# Patient Record
Sex: FEMALE | Race: WHITE | NOT HISPANIC OR LATINO | Employment: OTHER | URBAN - METROPOLITAN AREA
[De-identification: names, ages, dates, MRNs, and addresses within clinical notes are randomized per-mention and may not be internally consistent; named-entity substitution may affect disease eponyms.]

---

## 2022-10-27 DIAGNOSIS — R93.89 THICKENED ENDOMETRIUM: Primary | ICD-10-CM

## 2022-10-27 DIAGNOSIS — N84.0 UTERINE POLYP: ICD-10-CM

## 2022-11-21 PROBLEM — N80.9 ENDOMETRIOSIS: Status: ACTIVE | Noted: 2022-11-21

## 2023-03-01 PROBLEM — Z34.90 PRENATAL CARE, ANTEPARTUM: Status: ACTIVE | Noted: 2023-03-01

## 2023-03-01 PROBLEM — O09.819 HIGH RISK PREGNANCY DUE TO ASSISTED REPRODUCTIVE TECHNOLOGY: Status: ACTIVE | Noted: 2023-03-01

## 2023-04-25 ENCOUNTER — ROUTINE PRENATAL (OUTPATIENT)
Dept: OBSTETRICS AND GYNECOLOGY | Facility: CLINIC | Age: 28
End: 2023-04-25
Payer: MEDICAID

## 2023-04-25 VITALS — BODY MASS INDEX: 23.01 KG/M2 | WEIGHT: 121.8 LBS | SYSTOLIC BLOOD PRESSURE: 100 MMHG | DIASTOLIC BLOOD PRESSURE: 60 MMHG

## 2023-04-25 DIAGNOSIS — Z34.90 PRENATAL CARE, ANTEPARTUM: Primary | ICD-10-CM

## 2023-04-25 DIAGNOSIS — O09.819 HIGH RISK PREGNANCY DUE TO ASSISTED REPRODUCTIVE TECHNOLOGY: ICD-10-CM

## 2023-04-25 LAB
GLUCOSE UR STRIP-MCNC: NEGATIVE MG/DL
PROT UR STRIP-MCNC: NEGATIVE MG/DL

## 2023-04-25 RX ORDER — ASPIRIN 81 MG/1
81 TABLET, CHEWABLE ORAL DAILY
COMMUNITY

## 2023-04-25 NOTE — PROGRESS NOTES
"      OB FOLLOW UP  CC- Here for care of pregnancy        Emma Fernandez is a 28 y.o.  17w5d patient being seen today for her obstetrical follow up visit. Patient reports occasional headaches.     Her prenatal care is complicated by (and status) :   Patient Active Problem List   Diagnosis   • Endometriosis   • Prenatal care, antepartum   • High risk pregnancy due to assisted reproductive technology         Ultrasound Today: No    AFP: declines today, (also declined cfdna unless something is abnormal or concerning on anatomy u/s)    ROS -   Patient Denies: Loss of Fluid, Vaginal Spotting, Vision Changes, Nausea  and Vomiting   Fetal Movement : \"flutters\"   All other systems reviewed and are negative.       The additional following portions of the patient's history were reviewed and updated as appropriate: allergies, current medications, past family history, past medical history, past social history, past surgical history and problem list.    I have reviewed and agree with the HPI, ROS, and historical information as entered above. Florence Colorado MD        EXAM:     Prenatal Vitals  BP: 100/60  Weight: 55.2 kg (121 lb 12.8 oz)   Fetal Heart Rate: 155   Pelvic Exam:        Urine Glucose Read-only: Negative  Urine Protein Read-only: Negative           Assessment and Plan    Problem List Items Addressed This Visit        Gravid and     Prenatal care, antepartum - Primary    Relevant Orders    US Ob 14 + Weeks Single or First Gestation    POC Urinalysis Dipstick (Completed)    High risk pregnancy due to assisted reproductive technology    Overview     IVF by Dr. Hill in in [place]  Recommend baby aspirin weeks 12 through 36  We will have [facility] perform fetal echo secondary to IVF pregnancy.         Relevant Orders    US Ob 14 + Weeks Single or First Gestation    POC Urinalysis Dipstick (Completed)       1. Pregnancy at 17w5d  2. Fetal status reassuring.   3. Counseled on MSAFP alone in relation to OTD " and placental issues.    4. Anatomy scan next visit.   5. Activity and Exercise discussed.  6. Patient is on Prenatal vitamins  Return in about 2 weeks (around 5/9/2023) for anatomy usg.    Florence Colorado MD  04/25/2023

## 2023-05-01 ENCOUNTER — REFERRAL TRIAGE (OUTPATIENT)
Dept: LABOR AND DELIVERY | Facility: HOSPITAL | Age: 28
End: 2023-05-01
Payer: MEDICAID

## 2023-05-08 ENCOUNTER — ROUTINE PRENATAL (OUTPATIENT)
Dept: OBSTETRICS AND GYNECOLOGY | Facility: CLINIC | Age: 28
End: 2023-05-08
Payer: MEDICAID

## 2023-05-08 VITALS — SYSTOLIC BLOOD PRESSURE: 106 MMHG | DIASTOLIC BLOOD PRESSURE: 70 MMHG | WEIGHT: 122.6 LBS | BODY MASS INDEX: 23.17 KG/M2

## 2023-05-08 DIAGNOSIS — O44.40 LOW-LYING PLACENTA: ICD-10-CM

## 2023-05-08 DIAGNOSIS — O09.819 HIGH RISK PREGNANCY DUE TO ASSISTED REPRODUCTIVE TECHNOLOGY: ICD-10-CM

## 2023-05-08 DIAGNOSIS — Z34.92 PRENATAL CARE IN SECOND TRIMESTER: Primary | ICD-10-CM

## 2023-05-08 LAB
GLUCOSE UR STRIP-MCNC: NEGATIVE MG/DL
PROT UR STRIP-MCNC: NEGATIVE MG/DL

## 2023-05-08 NOTE — PROGRESS NOTES
OB FOLLOW UP  CC- Here for care of pregnancy        Emma Fernandez is a 28 y.o.  19w4d patient being seen today for her obstetrical follow up visit. Patient reports headaches without vision changes. She states that headaches usually go away with rest.    Her prenatal care is complicated by (and status) : None  Patient Active Problem List   Diagnosis   • Endometriosis   • Prenatal care, antepartum   • High risk pregnancy due to assisted reproductive technology   • Low-lying placenta       Flu Status: Declines  US done today: Yes.  Findings showed Female infant in footling breech presentation with fetal heart rate of 146.  Placenta is posterior and low-lying.  Three-vessel cord noted normal fluid volume.  Estimated fetal weight 11 ounces.  No fetal anomaly seen.  Cervical length 34 mm..  I have personally evaluated the U/S and agree with the findings. Florence Colorado MD      ROS -   Patient Reports : Headaches  Patient Denies: Loss of Fluid, Vaginal Spotting, Vision Changes, Nausea  and Vomiting   Fetal Movement : normal  All other systems reviewed and are negative.       The additional following portions of the patient's history were reviewed and updated as appropriate: allergies, current medications, past family history, past medical history, past social history, past surgical history and problem list.    I have reviewed and agree with the HPI, ROS, and historical information as entered above. Florence Colorado MD    /70   Wt 55.6 kg (122 lb 9.6 oz)   LMP 10/24/2022 (Exact Date)   BMI 23.17 kg/m²       EXAM:     Prenatal Vitals  BP: 106/70  Weight: 55.6 kg (122 lb 9.6 oz)              Urine Glucose Read-only: Negative  Urine Protein Read-only: Negative       Assessment and Plan    Problem List Items Addressed This Visit        Gravid and     High risk pregnancy due to assisted reproductive technology    Overview     IVF by Dr. Hill in in [place]  Recommend baby aspirin weeks  12 through 36  We will have [facility] perform fetal echo secondary to IVF pregnancy.         Relevant Orders    [facility]  Diagnostic [facility]    Low-lying placenta    Overview     Seen at 19+4 weeks.  Advised follow-up.         Relevant Orders    [facility] Diagnostic [facility]   Other Visit Diagnoses     Prenatal care in second trimester    -  Primary    Relevant Orders    POC Urinalysis Dipstick (Completed)          1. Pregnancy at 19w4d  2. Anatomy scan today is Complete.  Only low-lying placenta seen.  Follow-up with [facility] for fetal echo in 4 weeks.  3. Fetal status reassuring.   4. Activity and Exercise discussed.  5. Patient is on Prenatal vitamins  Return in about 4 weeks (around 2023) for [facility] same day.    Florence Colorado MD  2023

## 2023-05-16 ENCOUNTER — PATIENT OUTREACH (OUTPATIENT)
Dept: LABOR AND DELIVERY | Facility: HOSPITAL | Age: 28
End: 2023-05-16
Payer: MEDICAID

## 2023-05-16 NOTE — OUTREACH NOTE
[facility]  Unable to Reach       Questions/Answers    Flowsheet Row Responses   Pending Outreach Confirm Patient Interest   Call Attempt First   Outcome No answer/busy, Left message, [employer] message sent to patient   Next Call Attempt Date 05/22/23          Confirmation of interest letter sent via [employer] message.    BETSEY Santos RN  Maternity Nurse Navigator    5/16/2023, 17:37 EDT

## 2023-05-22 ENCOUNTER — PATIENT OUTREACH (OUTPATIENT)
Dept: LABOR AND DELIVERY | Facility: HOSPITAL | Age: 28
End: 2023-05-22
Payer: MEDICAID

## 2023-05-22 NOTE — OUTREACH NOTE
[facility]  Unable to Reach       Questions/Answers    Flowsheet Row Responses   Pending Outreach Confirm Patient Interest   Call Attempt Second   Outcome No answer/busy, Left message   Unable to reach comments: Contact information provided.        Contact info provided, encouraged to call if she has any questions, concerns, or needs assistance with resources.     BETSEY Santos RN  Maternity Nurse Navigator    5/22/2023, 18:25 EDT

## 2023-06-05 ENCOUNTER — ROUTINE PRENATAL (OUTPATIENT)
Dept: OBSTETRICS AND GYNECOLOGY | Facility: CLINIC | Age: 28
End: 2023-06-05
Payer: MEDICAID

## 2023-06-05 ENCOUNTER — OFFICE VISIT (OUTPATIENT)
Dept: OBSTETRICS AND GYNECOLOGY | Facility: HOSPITAL | Age: 28
End: 2023-06-05
Payer: MEDICAID

## 2023-06-05 ENCOUNTER — HOSPITAL ENCOUNTER (OUTPATIENT)
Dept: WOMENS IMAGING | Facility: HOSPITAL | Age: 28
Discharge: HOME | End: 2023-06-05
Admitting: OBSTETRICS & GYNECOLOGY
Payer: MEDICAID

## 2023-06-05 VITALS
BODY MASS INDEX: 23.48 KG/M2 | HEIGHT: 62 IN | DIASTOLIC BLOOD PRESSURE: 68 MMHG | SYSTOLIC BLOOD PRESSURE: 96 MMHG | WEIGHT: 127.6 LBS

## 2023-06-05 VITALS — WEIGHT: 127.1 LBS | BODY MASS INDEX: 23.25 KG/M2 | DIASTOLIC BLOOD PRESSURE: 68 MMHG | SYSTOLIC BLOOD PRESSURE: 108 MMHG

## 2023-06-05 DIAGNOSIS — O09.819 HIGH RISK PREGNANCY DUE TO ASSISTED REPRODUCTIVE TECHNOLOGY: ICD-10-CM

## 2023-06-05 DIAGNOSIS — O44.40 LOW-LYING PLACENTA: Primary | ICD-10-CM

## 2023-06-05 DIAGNOSIS — O44.40 LOW-LYING PLACENTA: ICD-10-CM

## 2023-06-05 DIAGNOSIS — Z34.92 PRENATAL CARE IN SECOND TRIMESTER: Primary | ICD-10-CM

## 2023-06-05 PROBLEM — O44.20 MARGINAL PLACENTA PREVIA: Status: ACTIVE | Noted: 2023-05-08

## 2023-06-05 LAB
GLUCOSE UR STRIP-MCNC: NEGATIVE MG/DL
PROT UR STRIP-MCNC: NEGATIVE MG/DL

## 2023-06-05 PROCEDURE — 76825 ECHO EXAM OF FETAL HEART: CPT | Performed by: OBSTETRICS & GYNECOLOGY

## 2023-06-05 PROCEDURE — 76825 ECHO EXAM OF FETAL HEART: CPT

## 2023-06-05 PROCEDURE — 93325 DOPPLER ECHO COLOR FLOW MAPG: CPT | Performed by: OBSTETRICS & GYNECOLOGY

## 2023-06-05 PROCEDURE — 76811 OB US DETAILED SNGL FETUS: CPT

## 2023-06-05 PROCEDURE — 93325 DOPPLER ECHO COLOR FLOW MAPG: CPT

## 2023-06-05 PROCEDURE — 76811 OB US DETAILED SNGL FETUS: CPT | Performed by: OBSTETRICS & GYNECOLOGY

## 2023-06-05 NOTE — PROGRESS NOTES
OB FOLLOW UP  CC- Here for care of pregnancy        Emma Fernandez is a 28 y.o.  23w4d patient being seen today for her obstetrical follow up visit. Patient reports an increase in anxiety since her grandmother passed away on 23.    Her prenatal care is complicated by (and status) :   Patient Active Problem List   Diagnosis    Endometriosis    Prenatal care, antepartum    High risk pregnancy due to assisted reproductive technology    Low-lying placenta     US done today: Yes at [facility].  Report pending    ROS -   Patient Reports : No Problems  Patient Denies: Loss of Fluid, Vaginal Spotting, Vision Changes, Headaches, Nausea , Vomiting , Contractions, and Epigastric pain  Fetal Movement : normal  All other systems reviewed and are negative.       The additional following portions of the patient's history were reviewed and updated as appropriate: allergies, current medications, past family history, past medical history, past social history, past surgical history, and problem list.      I have reviewed and agree with the HPI, ROS, and historical information as entered above. Florence Colorado MD      /68   Wt 57.7 kg (127 lb 1.6 oz)   LMP 10/24/2022 (Exact Date)   BMI 23.25 kg/m²       EXAM:     Prenatal Vitals  BP: 108/68  Weight: 57.7 kg (127 lb 1.6 oz)   Fetal Heart Rate: usg               Urine Glucose Read-only: Negative  Urine Protein Read-only: Negative       Assessment and Plan    Problem List Items Addressed This Visit          Gravid and     High risk pregnancy due to assisted reproductive technology    Overview     IVF by Dr. Hill in in [place]  Recommend baby aspirin weeks 12 through 36  We will have [facility] perform fetal echo secondary to IVF pregnancy.         Low-lying placenta    Overview     Seen at 19+4 weeks.  Still present at 23 weeks at [facility].  [facility] will reevaluate at 32 weeks.          Other Visit Diagnoses       Prenatal care in second trimester    -  Primary     Relevant Orders    POC Urinalysis Dipstick (Completed)            Pregnancy at 23w4d  Fetal status reassuring.  [facility] ultrasound today.  Report still pending.  1 hour gtt, CBC, Antibody screen, and TDAP next visit. Instructions given  Discussed/encouraged TDAP vaccination after 28 weeks  Activity and Exercise discussed.  Return in about 4 weeks (around 7/3/2023) for glucola.    Florence Colorado MD  06/05/2023

## 2023-07-11 PROBLEM — O99.019 MATERNAL ANEMIA IN PREGNANCY, ANTEPARTUM: Status: ACTIVE | Noted: 2023-07-11

## 2023-07-11 PROBLEM — R73.09 ELEVATED GLUCOSE TOLERANCE TEST: Status: ACTIVE | Noted: 2023-07-11

## 2023-07-14 PROBLEM — O24.410 DIET CONTROLLED GESTATIONAL DIABETES MELLITUS (GDM) IN THIRD TRIMESTER: Status: ACTIVE | Noted: 2023-07-11

## 2023-07-18 PROBLEM — F41.9 ANXIETY: Status: ACTIVE | Noted: 2021-02-01

## 2023-08-07 ENCOUNTER — ROUTINE PRENATAL (OUTPATIENT)
Dept: OBSTETRICS AND GYNECOLOGY | Facility: CLINIC | Age: 28
End: 2023-08-07
Payer: MEDICAID

## 2023-08-07 ENCOUNTER — HOSPITAL ENCOUNTER (OUTPATIENT)
Dept: WOMENS IMAGING | Facility: HOSPITAL | Age: 28
Discharge: HOME | End: 2023-08-07
Admitting: OBSTETRICS & GYNECOLOGY
Payer: MEDICAID

## 2023-08-07 ENCOUNTER — OFFICE VISIT (OUTPATIENT)
Dept: OBSTETRICS AND GYNECOLOGY | Facility: HOSPITAL | Age: 28
End: 2023-08-07
Payer: MEDICAID

## 2023-08-07 VITALS — WEIGHT: 139 LBS | BODY MASS INDEX: 26.26 KG/M2 | SYSTOLIC BLOOD PRESSURE: 102 MMHG | DIASTOLIC BLOOD PRESSURE: 70 MMHG

## 2023-08-07 VITALS — BODY MASS INDEX: 26.45 KG/M2 | SYSTOLIC BLOOD PRESSURE: 105 MMHG | WEIGHT: 140 LBS | DIASTOLIC BLOOD PRESSURE: 71 MMHG

## 2023-08-07 DIAGNOSIS — O36.63X0 MACROSOMIA OF FETUS AFFECTING MANAGEMENT OF MOTHER IN THIRD TRIMESTER, SINGLE OR UNSPECIFIED FETUS: ICD-10-CM

## 2023-08-07 DIAGNOSIS — O44.40 LOW-LYING PLACENTA: ICD-10-CM

## 2023-08-07 DIAGNOSIS — O44.20 MARGINAL PLACENTA PREVIA: ICD-10-CM

## 2023-08-07 DIAGNOSIS — O09.819 HIGH RISK PREGNANCY DUE TO ASSISTED REPRODUCTIVE TECHNOLOGY: ICD-10-CM

## 2023-08-07 DIAGNOSIS — Z3A.32 32 WEEKS GESTATION OF PREGNANCY: ICD-10-CM

## 2023-08-07 DIAGNOSIS — O24.410 DIET CONTROLLED GESTATIONAL DIABETES MELLITUS (GDM) IN THIRD TRIMESTER: ICD-10-CM

## 2023-08-07 DIAGNOSIS — Z34.90 PRENATAL CARE, ANTEPARTUM: Primary | ICD-10-CM

## 2023-08-07 DIAGNOSIS — O44.20 MARGINAL PLACENTA PREVIA: Primary | ICD-10-CM

## 2023-08-07 DIAGNOSIS — O99.019 MATERNAL ANEMIA IN PREGNANCY, ANTEPARTUM: ICD-10-CM

## 2023-08-07 LAB
GLUCOSE UR STRIP-MCNC: NEGATIVE MG/DL
PROT UR STRIP-MCNC: NEGATIVE MG/DL

## 2023-08-07 PROCEDURE — 76816 OB US FOLLOW-UP PER FETUS: CPT

## 2023-08-07 PROCEDURE — 76819 FETAL BIOPHYS PROFIL W/O NST: CPT

## 2023-08-07 PROCEDURE — 99214 OFFICE O/P EST MOD 30 MIN: CPT | Performed by: OBSTETRICS & GYNECOLOGY

## 2023-08-07 RX ORDER — CETIRIZINE HYDROCHLORIDE 10 MG/1
TABLET ORAL
COMMUNITY
Start: 2023-08-02

## 2023-08-07 NOTE — PROGRESS NOTES
Patient denies bleeding, leaking fluid or contractions  NIPT declined  Patient is followed by Dr. Garza for blood sugar and patient states she is diet controlled only.  Patients A1C was 5.0  Patients next follow up with Dr. Colorado is today

## 2023-08-07 NOTE — PROGRESS NOTES
OB FOLLOW UP  CC- Here for care of pregnancy        Emma Fernandez is a 28 y.o.  32w4d patient being seen today for her obstetrical follow up visit. Patient reports irregular contractions and increase swelling.     Her prenatal care is complicated by (and status) :  GDM diet controlled. Her average fasting BG is 70-80. Her average 2hr PP BG is <100, highest has been 118  Patient Active Problem List   Diagnosis    Endometriosis    Prenatal care, antepartum    High risk pregnancy due to assisted reproductive technology    Marginal placenta previa    Diet controlled gestational diabetes mellitus (GDM) in third trimester    Maternal anemia in pregnancy, antepartum    Anxiety         TDAP status: received at last visit  Rhogam status: was not indicated  28 week labs: Reviewed, Labs show anemia. She is taking additional iron supplement., and Failed 3hr gtt. Patient has seen endocrine.  Ultrasound Today: Yes at [facility]. Report is pending. Per patient baby is LGA at 5#5oz, placenta has not moved and she will see them back in 4 weeks.  Non Stress Test: No.      ROS -   Patient Reports : Contractions  Patient Denies: Loss of Fluid, Vaginal Spotting, Vision Changes, and Headaches  Fetal Movement : normal  All other systems reviewed and are negative.       The additional following portions of the patient's history were reviewed and updated as appropriate: allergies, current medications, past family history, past medical history, past social history, past surgical history, and problem list.    I have reviewed and agree with the HPI, ROS, and historical information as entered above. Florence Colorado MD      /70   Wt 63 kg (139 lb)   LMP 10/24/2022 (Exact Date)   BMI 26.26 kg/mý         EXAM:     Prenatal Vitals  BP: 102/70  Weight: 63 kg (139 lb)   Fetal Heart Rate: usg               Urine Glucose Read-only: Negative  Urine Protein Read-only: Negative           Assessment and Plan    Problem List Items  Addressed This Visit          Endocrine and Metabolic    Diet controlled gestational diabetes mellitus (GDM) in third trimester    Overview     147 and 28 weeks.  Failed 3hour. Will set up with endocrine            Gravid and     Prenatal care, antepartum - Primary    Relevant Orders    POC Urinalysis Dipstick (Completed)    High risk pregnancy due to assisted reproductive technology    Overview     IVF by Dr. Hill in in [place]  Recommend baby aspirin weeks 12 through 36  We will have [facility] perform fetal echo secondary to IVF pregnancy.         Marginal placenta previa    Overview     Posterior.  Seen at 19+4 weeks.  Still present at 23 weeks at [facility].      [facility] at 32wks previa still present. Repeat scan at [facility] at 36w            Hematology and Neoplasia    Maternal anemia in pregnancy, antepartum    Overview     Hemoglobin 8.4 at 28 weeks.  Advised twice daily iron supplementation.         Relevant Medications    Ferrous Sulfate ER (Slow Fe) 142 (45 Fe) MG tablet controlled-release       Pregnancy at 32w4d  Fetal status reassuring.  28 week labs reviewed.    Activity and Exercise discussed.  Fetal movement/PTL or Labor precautions  Patient is on Prenatal vitamins  Reviewed FSBS goals: fasting <90, 2hr PP < 120  Return in about 2 weeks (around 2023).    Florence Colorado MD  2023

## 2023-08-07 NOTE — PROGRESS NOTES
Patient seen in Maternal Fetal Medicine clinic today. Please see full note in under imaging tab of patient chart in Epic (Viewpoint report).    Liliam Ann MD

## 2023-08-18 ENCOUNTER — OFFICE VISIT (OUTPATIENT)
Dept: ENDOCRINOLOGY | Facility: CLINIC | Age: 28
End: 2023-08-18
Payer: MEDICAID

## 2023-08-18 VITALS
HEART RATE: 115 BPM | OXYGEN SATURATION: 99 % | WEIGHT: 139.6 LBS | BODY MASS INDEX: 26.36 KG/M2 | HEIGHT: 61 IN | DIASTOLIC BLOOD PRESSURE: 60 MMHG | SYSTOLIC BLOOD PRESSURE: 96 MMHG

## 2023-08-18 DIAGNOSIS — O24.410 DIET CONTROLLED GESTATIONAL DIABETES MELLITUS (GDM) IN THIRD TRIMESTER: Primary | ICD-10-CM

## 2023-08-18 NOTE — LETTER
August 18, 2023       No Recipients    Patient: Emma Fernandez   YOB: 1995   Date of Visit: 8/18/2023     Dear Florence Colorado MD:       Thank you for referring Emma Fernandez to me for evaluation. Below are the relevant portions of my assessment and plan of care.    If you have questions, please do not hesitate to call me. I look forward to following Emma along with you.         Sincerely,        Inessa Garza MD        CC:   No Recipients    Inessa Garza MD  08/18/23 1237  Sign when Signing Visit  Emma Fernandez 28 y.o.  CC:Follow-up and Gestational Diabetes (ROSIE 9-28-23 OB Florence Colorado MD)      [place]: Follow-up and Gestational Diabetes (ROSIE 9-28-23 OB Florence Colorado MD)    Doing well overall  Diet controlled GDM   Baby LGA- OB considering early delivery   Baby 5.8 lbs last week  Is 34 weeks  Reviewed u/s and baby is larger overall (not just AC)  She is emailing weekly   Discussed if all sugars remain normal she can reduce testing to fasting and 2 hour pp largest meal after 36 weeks    No Known Allergies    Current Outpatient Medications:     Acetone, Urine, Test (Ketone Test) strip, 1 each by In Vitro route Daily., Disp: 50 strip, Rfl: 2    aspirin 81 MG chewable tablet, Chew 1 tablet Daily., Disp: , Rfl:     Blood Glucose Monitoring Suppl w/Device kit, 1 each Daily. Please dispense any meter, strips and lancets that are formulary, Disp: 1 each, Rfl: 0    cetirizine (zyrTEC) 10 MG tablet, , Disp: , Rfl:     docusate sodium (Colace Clear) 50 MG capsule, , Disp: , Rfl:     Ferrous Sulfate ER (Slow Fe) 142 (45 Fe) MG tablet controlled-release, , Disp: , Rfl:     Glucose Blood (Blood Glucose Test) strip, Test blood sugars QID, Disp: 150 each, Rfl: 3    Lancets misc, Test blood sugars QID, Disp: 100 each, Rfl: 5    Prenatal Vit-Fe Fumarate-FA (PRENATAL PO), Take  by mouth., Disp: , Rfl:   Patient Active Problem List    Diagnosis     Diet controlled gestational diabetes mellitus  "(GDM) in third trimester [O24.410]     Maternal anemia in pregnancy, antepartum [O99.019]     Marginal placenta previa [O44.20]     Prenatal care, antepartum [Z34.90]     High risk pregnancy due to assisted reproductive technology [O09.819]     Endometriosis [N80.9]     Anxiety [F41.9]      Review of Systems   Constitutional:  Negative for activity change, appetite change and unexpected weight change.   HENT:  Negative for congestion and rhinorrhea.    Eyes:  Negative for visual disturbance.   Respiratory:  Negative for cough and shortness of breath.    Cardiovascular:  Negative for palpitations and leg swelling.   Gastrointestinal:  Negative for constipation, diarrhea and nausea.   Genitourinary:  Negative for hematuria.   Musculoskeletal:  Negative for arthralgias, back pain, gait problem, joint swelling and myalgias.   Skin:  Negative for color change, rash and wound.   Allergic/Immunologic: Negative for environmental allergies, food allergies and immunocompromised state.   Neurological:  Negative for dizziness, weakness and light-headedness.   Psychiatric/Behavioral:  Negative for confusion, decreased concentration, dysphoric mood and sleep disturbance. The patient is not nervous/anxious.    Social History     Socioeconomic History    Marital status:    Tobacco Use    Smoking status: Never    Smokeless tobacco: Never   Vaping Use    Vaping Use: Never used   Substance and Sexual Activity    Alcohol use: Never    Drug use: Never    Sexual activity: Yes     Partners: Male     Family History   Problem Relation Age of Onset    Colon cancer Father         Rectal Cancer    Diabetes Maternal Grandmother     Prostate cancer Paternal Grandfather     Breast cancer Neg Hx     Ovarian cancer Neg Hx     Uterine cancer Neg Hx      BP 96/60   Pulse 115   Ht 154.9 cm (61\")   Wt 63.3 kg (139 lb 9.6 oz)   LMP 10/24/2022 (Exact Date)   SpO2 99%   BMI 26.38 kg/mý   Physical Exam  Vitals and nursing " note reviewed.   Constitutional:       Appearance: Normal appearance. She is well-developed.   HENT:      Head: Normocephalic and atraumatic.   Eyes:      General: Lids are normal.      Extraocular Movements: Extraocular movements intact.      Conjunctiva/sclera: Conjunctivae normal.      Pupils: Pupils are equal, round, and reactive to light.   Neck:      Thyroid: No thyroid mass or thyromegaly.      Vascular: No carotid bruit.      Trachea: Trachea normal. No tracheal deviation.   Cardiovascular:      Rate and Rhythm: Normal rate and regular rhythm.      Pulses: Normal pulses.      Heart sounds: Normal heart sounds. No murmur heard.    No friction rub. No gallop.   Pulmonary:      Effort: Pulmonary effort is normal. No respiratory distress.      Breath sounds: Normal breath sounds. No wheezing.   Musculoskeletal:         General: No deformity. Normal range of motion.      Cervical back: Normal range of motion and neck supple.   Lymphadenopathy:      Cervical: No cervical adenopathy.   Skin:     General: Skin is warm and dry.      Findings: No erythema or rash.      Nails: There is no clubbing.   Neurological:      General: No focal deficit present.      Mental Status: She is alert and oriented to person, place, and time.      Cranial Nerves: No cranial nerve deficit.      Deep Tendon Reflexes: Reflexes are normal and symmetric. Reflexes normal.   Psychiatric:         Mood and Affect: Mood normal.         Speech: Speech normal.         Behavior: Behavior normal.         Thought Content: Thought content normal.         Judgment: Judgment normal.     Results for orders placed or performed in visit on 08/07/23   POC Urinalysis Dipstick    Specimen: Urine   Result Value Ref Range    Glucose, UA Negative Negative mg/dL    Protein, POC Negative Negative mg/dL     Diagnoses and all orders for this visit:    1. Diet controlled gestational diabetes mellitus (GDM) in third trimester (Primary)  Assessment & Plan:  With normal  blood sugars maintained with diet  Continue weekly email   Can reduce testing after 36 weeks  Anticipated early delivery due to LGA baby   F/u here 8 weeks pp for repeat testing       Return in about 12 weeks (around 11/10/2023) for Recheck.    Inessa Garza MD  Signed Inessa Garza MD

## 2023-08-18 NOTE — ASSESSMENT & PLAN NOTE
With normal blood sugars maintained with diet  Continue weekly email   Can reduce testing after 36 weeks  Anticipated early delivery due to LGA baby   F/u here 8 weeks pp for repeat testing

## 2023-08-18 NOTE — PROGRESS NOTES
Emma Fernandez 28 y.o.  CC:Follow-up and Gestational Diabetes (ROSIE 9-28-23 OB Florence Colorado MD)      [place]: Follow-up and Gestational Diabetes (ROSIE 9-28-23 OB Florence Colorado MD)    Doing well overall  Diet controlled GDM   Baby LGA- OB considering early delivery   Baby 5.8 lbs last week  Is 34 weeks  Reviewed u/s and baby is larger overall (not just AC)  She is emailing weekly   Discussed if all sugars remain normal she can reduce testing to fasting and 2 hour pp largest meal after 36 weeks    No Known Allergies    Current Outpatient Medications:     Acetone, Urine, Test (Ketone Test) strip, 1 each by In Vitro route Daily., Disp: 50 strip, Rfl: 2    aspirin 81 MG chewable tablet, Chew 1 tablet Daily., Disp: , Rfl:     Blood Glucose Monitoring Suppl w/Device kit, 1 each Daily. Please dispense any meter, strips and lancets that are formulary, Disp: 1 each, Rfl: 0    cetirizine (zyrTEC) 10 MG tablet, , Disp: , Rfl:     docusate sodium (Colace Clear) 50 MG capsule, , Disp: , Rfl:     Ferrous Sulfate ER (Slow Fe) 142 (45 Fe) MG tablet controlled-release, , Disp: , Rfl:     Glucose Blood (Blood Glucose Test) strip, Test blood sugars QID, Disp: 150 each, Rfl: 3    Lancets misc, Test blood sugars QID, Disp: 100 each, Rfl: 5    Prenatal Vit-Fe Fumarate-FA (PRENATAL PO), Take  by mouth., Disp: , Rfl:   Patient Active Problem List    Diagnosis     Diet controlled gestational diabetes mellitus (GDM) in third trimester [O24.410]     Maternal anemia in pregnancy, antepartum [O99.019]     Marginal placenta previa [O44.20]     Prenatal care, antepartum [Z34.90]     High risk pregnancy due to assisted reproductive technology [O09.819]     Endometriosis [N80.9]     Anxiety [F41.9]      Review of Systems   Constitutional:  Negative for activity change, appetite change and unexpected weight change.   HENT:  Negative for congestion and rhinorrhea.    Eyes:  Negative for visual disturbance.   Respiratory:  Negative for cough and shortness of  "breath.    Cardiovascular:  Negative for palpitations and leg swelling.   Gastrointestinal:  Negative for constipation, diarrhea and nausea.   Genitourinary:  Negative for hematuria.   Musculoskeletal:  Negative for arthralgias, back pain, gait problem, joint swelling and myalgias.   Skin:  Negative for color change, rash and wound.   Allergic/Immunologic: Negative for environmental allergies, food allergies and immunocompromised state.   Neurological:  Negative for dizziness, weakness and light-headedness.   Psychiatric/Behavioral:  Negative for confusion, decreased concentration, dysphoric mood and sleep disturbance. The patient is not nervous/anxious.    Social History     Socioeconomic History    Marital status:    Tobacco Use    Smoking status: Never    Smokeless tobacco: Never   Vaping Use    Vaping Use: Never used   Substance and Sexual Activity    Alcohol use: Never    Drug use: Never    Sexual activity: Yes     Partners: Male     Family History   Problem Relation Age of Onset    Colon cancer Father         Rectal Cancer    Diabetes Maternal Grandmother     Prostate cancer Paternal Grandfather     Breast cancer Neg Hx     Ovarian cancer Neg Hx     Uterine cancer Neg Hx      BP 96/60   Pulse 115   Ht 154.9 cm (61\")   Wt 63.3 kg (139 lb 9.6 oz)   LMP 10/24/2022 (Exact Date)   SpO2 99%   BMI 26.38 kg/mý   Physical Exam  Vitals and nursing note reviewed.   Constitutional:       Appearance: Normal appearance. She is well-developed.   HENT:      Head: Normocephalic and atraumatic.   Eyes:      General: Lids are normal.      Extraocular Movements: Extraocular movements intact.      Conjunctiva/sclera: Conjunctivae normal.      Pupils: Pupils are equal, round, and reactive to light.   Neck:      Thyroid: No thyroid mass or thyromegaly.      Vascular: No carotid bruit.      Trachea: Trachea normal. No tracheal deviation.   Cardiovascular:      Rate and Rhythm: Normal rate and regular rhythm.      Pulses: " Normal pulses.      Heart sounds: Normal heart sounds. No murmur heard.    No friction rub. No gallop.   Pulmonary:      Effort: Pulmonary effort is normal. No respiratory distress.      Breath sounds: Normal breath sounds. No wheezing.   Musculoskeletal:         General: No deformity. Normal range of motion.      Cervical back: Normal range of motion and neck supple.   Lymphadenopathy:      Cervical: No cervical adenopathy.   Skin:     General: Skin is warm and dry.      Findings: No erythema or rash.      Nails: There is no clubbing.   Neurological:      General: No focal deficit present.      Mental Status: She is alert and oriented to person, place, and time.      Cranial Nerves: No cranial nerve deficit.      Deep Tendon Reflexes: Reflexes are normal and symmetric. Reflexes normal.   Psychiatric:         Mood and Affect: Mood normal.         Speech: Speech normal.         Behavior: Behavior normal.         Thought Content: Thought content normal.         Judgment: Judgment normal.     Results for orders placed or performed in visit on 08/07/23   POC Urinalysis Dipstick    Specimen: Urine   Result Value Ref Range    Glucose, UA Negative Negative mg/dL    Protein, POC Negative Negative mg/dL     Diagnoses and all orders for this visit:    1. Diet controlled gestational diabetes mellitus (GDM) in third trimester (Primary)  Assessment & Plan:  With normal blood sugars maintained with diet  Continue weekly email   Can reduce testing after 36 weeks  Anticipated early delivery due to LGA baby   F/u here 8 weeks pp for repeat testing       Return in about 12 weeks (around 11/10/2023) for Recheck.    Inessa Garza MD  Signed Inessa Garza MD

## 2023-08-22 ENCOUNTER — ROUTINE PRENATAL (OUTPATIENT)
Dept: OBSTETRICS AND GYNECOLOGY | Facility: CLINIC | Age: 28
End: 2023-08-22
Payer: MEDICAID

## 2023-08-22 VITALS — DIASTOLIC BLOOD PRESSURE: 68 MMHG | SYSTOLIC BLOOD PRESSURE: 104 MMHG | WEIGHT: 139.2 LBS | BODY MASS INDEX: 26.3 KG/M2

## 2023-08-22 DIAGNOSIS — O24.410 DIET CONTROLLED GESTATIONAL DIABETES MELLITUS (GDM) IN THIRD TRIMESTER: ICD-10-CM

## 2023-08-22 DIAGNOSIS — Z34.90 PRENATAL CARE, ANTEPARTUM: Primary | ICD-10-CM

## 2023-08-22 DIAGNOSIS — O99.019 MATERNAL ANEMIA IN PREGNANCY, ANTEPARTUM: ICD-10-CM

## 2023-08-22 DIAGNOSIS — O44.20 MARGINAL PLACENTA PREVIA: ICD-10-CM

## 2023-08-22 DIAGNOSIS — O09.819 HIGH RISK PREGNANCY DUE TO ASSISTED REPRODUCTIVE TECHNOLOGY: ICD-10-CM

## 2023-08-22 LAB
GLUCOSE UR STRIP-MCNC: NEGATIVE MG/DL
PROT UR STRIP-MCNC: NEGATIVE MG/DL

## 2023-08-22 PROCEDURE — 99214 OFFICE O/P EST MOD 30 MIN: CPT | Performed by: OBSTETRICS & GYNECOLOGY

## 2023-08-22 NOTE — PROGRESS NOTES
OB FOLLOW UP  CC- Here for care of pregnancy        Emma Fernandez is a 28 y.o.  34w5d patient being seen today for her obstetrical follow up visit. Patient reports occasional mild cramping.     Her prenatal care is complicated by (and status) : GDM diet controlled. Her average fasting BG is 70s. Her average 2hr PP BG is 80s-90s  Patient Active Problem List   Diagnosis    Endometriosis    Prenatal care, antepartum    High risk pregnancy due to assisted reproductive technology    Marginal placenta previa    Diet controlled gestational diabetes mellitus (GDM) in third trimester    Maternal anemia in pregnancy, antepartum    Anxiety       Ultrasound Today: No  Non Stress Test: No.    ROS -   Patient Denies: Loss of Fluid, Vaginal Spotting, Vision Changes, Headaches, Nausea , Vomiting , and Epigastric pain  Fetal Movement : normal  All other systems reviewed and are negative.       The additional following portions of the patient's history were reviewed and updated as appropriate: allergies, current medications, past family history, past medical history, past social history, past surgical history, and problem list.    I have reviewed and agree with the HPI, ROS, and historical information as entered above. Florence Colorado MD      /68   Wt 63.1 kg (139 lb 3.2 oz)   LMP 10/24/2022 (Exact Date)   BMI 26.30 kg/mý       EXAM:     Prenatal Vitals  BP: 104/68  Weight: 63.1 kg (139 lb 3.2 oz)   Fetal Heart Rate: 132      Fundal Height (cm): 35 cm        Urine Glucose Read-only: Negative  Urine Protein Read-only: Negative           Assessment and Plan    Problem List Items Addressed This Visit          Endocrine and Metabolic    Diet controlled gestational diabetes mellitus (GDM) in third trimester    Overview     147 and 28 weeks.  Failed 3hour. Will set up with endocrine            Gravid and     Prenatal care, antepartum - Primary    Relevant Orders    POC Urinalysis Dipstick (Completed)     High risk pregnancy due to assisted reproductive technology    Overview     IVF by Dr. Hill in in [place]  Recommend baby aspirin weeks 12 through 36  We will have [facility] perform fetal echo secondary to IVF pregnancy.         Marginal placenta previa    Overview     Posterior.  Seen at 19+4 weeks.  Still present at 23 weeks at [facility].      [facility] at 32wks previa still present. Repeat scan at [facility] at 36w            Hematology and Neoplasia    Maternal anemia in pregnancy, antepartum    Overview     Hemoglobin 8.4 at 28 weeks.  Advised twice daily iron supplementation.         Relevant Medications    Ferrous Sulfate ER (Slow Fe) 142 (45 Fe) MG tablet controlled-release       Pregnancy at 34w5d  Fetal status reassuring.   Blood sugars with good control.  Activity and Exercise discussed.  Fetal movement/PTL or Labor precautions  GBS next visit  [facility] at next visit to ensure previa has moved and patient a candidate for vaginal delivery.  Return in about 2 weeks (around 9/5/2023) for [facility] same day.    Florence Colorado MD  08/22/2023

## 2023-09-05 ENCOUNTER — OFFICE VISIT (OUTPATIENT)
Dept: OBSTETRICS AND GYNECOLOGY | Facility: HOSPITAL | Age: 28
End: 2023-09-05
Payer: MEDICAID

## 2023-09-05 ENCOUNTER — LAB (OUTPATIENT)
Dept: LAB | Facility: HOSPITAL | Age: 28
End: 2023-09-05
Payer: MEDICAID

## 2023-09-05 ENCOUNTER — ROUTINE PRENATAL (OUTPATIENT)
Dept: OBSTETRICS AND GYNECOLOGY | Facility: CLINIC | Age: 28
End: 2023-09-05
Payer: MEDICAID

## 2023-09-05 ENCOUNTER — TELEPHONE (OUTPATIENT)
Dept: OBSTETRICS AND GYNECOLOGY | Facility: CLINIC | Age: 28
End: 2023-09-05

## 2023-09-05 ENCOUNTER — PREP FOR SURGERY (OUTPATIENT)
Dept: OTHER | Facility: HOSPITAL | Age: 28
End: 2023-09-05
Payer: MEDICAID

## 2023-09-05 ENCOUNTER — HOSPITAL ENCOUNTER (OUTPATIENT)
Dept: WOMENS IMAGING | Facility: HOSPITAL | Age: 28
Discharge: HOME | End: 2023-09-05
Payer: MEDICAID

## 2023-09-05 VITALS — WEIGHT: 143 LBS | BODY MASS INDEX: 27.02 KG/M2 | SYSTOLIC BLOOD PRESSURE: 109 MMHG | DIASTOLIC BLOOD PRESSURE: 73 MMHG

## 2023-09-05 VITALS — WEIGHT: 142.4 LBS | SYSTOLIC BLOOD PRESSURE: 108 MMHG | DIASTOLIC BLOOD PRESSURE: 68 MMHG | BODY MASS INDEX: 26.91 KG/M2

## 2023-09-05 DIAGNOSIS — O24.410 DIET CONTROLLED GESTATIONAL DIABETES MELLITUS (GDM) IN THIRD TRIMESTER: Primary | ICD-10-CM

## 2023-09-05 DIAGNOSIS — O09.819 HIGH RISK PREGNANCY DUE TO ASSISTED REPRODUCTIVE TECHNOLOGY: ICD-10-CM

## 2023-09-05 DIAGNOSIS — Z34.90 PRENATAL CARE, ANTEPARTUM: Primary | ICD-10-CM

## 2023-09-05 DIAGNOSIS — Z34.93 PRENATAL CARE IN THIRD TRIMESTER: ICD-10-CM

## 2023-09-05 DIAGNOSIS — O24.410 DIET CONTROLLED GESTATIONAL DIABETES MELLITUS (GDM) IN THIRD TRIMESTER: ICD-10-CM

## 2023-09-05 DIAGNOSIS — O99.019 MATERNAL ANEMIA IN PREGNANCY, ANTEPARTUM: ICD-10-CM

## 2023-09-05 DIAGNOSIS — Z3A.36 36 WEEKS GESTATION OF PREGNANCY: ICD-10-CM

## 2023-09-05 DIAGNOSIS — O44.20 MARGINAL PLACENTA PREVIA: ICD-10-CM

## 2023-09-05 DIAGNOSIS — O36.63X0 EXCESSIVE FETAL GROWTH AFFECTING MANAGEMENT OF PREGNANCY IN THIRD TRIMESTER, SINGLE OR UNSPECIFIED FETUS: ICD-10-CM

## 2023-09-05 DIAGNOSIS — O09.819 HIGH RISK PREGNANCY DUE TO ASSISTED REPRODUCTIVE TECHNOLOGY: Primary | ICD-10-CM

## 2023-09-05 LAB
GLUCOSE UR STRIP-MCNC: NEGATIVE MG/DL
PROT UR STRIP-MCNC: NEGATIVE MG/DL

## 2023-09-05 PROCEDURE — 76819 FETAL BIOPHYS PROFIL W/O NST: CPT

## 2023-09-05 PROCEDURE — 76816 OB US FOLLOW-UP PER FETUS: CPT

## 2023-09-05 PROCEDURE — 87081 CULTURE SCREEN ONLY: CPT

## 2023-09-05 RX ORDER — ACETAMINOPHEN 500 MG
1000 TABLET ORAL ONCE
OUTPATIENT
Start: 2023-09-05 | End: 2023-09-05

## 2023-09-05 RX ORDER — MISOPROSTOL 200 UG/1
800 TABLET ORAL AS NEEDED
OUTPATIENT
Start: 2023-09-05

## 2023-09-05 RX ORDER — CEFAZOLIN SODIUM 2 G/100ML
2 INJECTION, SOLUTION INTRAVENOUS ONCE
OUTPATIENT
Start: 2023-09-05 | End: 2023-09-05

## 2023-09-05 RX ORDER — KETOROLAC TROMETHAMINE 30 MG/ML
30 INJECTION, SOLUTION INTRAMUSCULAR; INTRAVENOUS ONCE
OUTPATIENT
Start: 2023-09-05 | End: 2023-09-05

## 2023-09-05 RX ORDER — CARBOPROST TROMETHAMINE 250 UG/ML
250 INJECTION, SOLUTION INTRAMUSCULAR AS NEEDED
OUTPATIENT
Start: 2023-09-05

## 2023-09-05 RX ORDER — SODIUM CHLORIDE 9 MG/ML
40 INJECTION, SOLUTION INTRAVENOUS AS NEEDED
OUTPATIENT
Start: 2023-09-05

## 2023-09-05 RX ORDER — SODIUM CHLORIDE, SODIUM LACTATE, POTASSIUM CHLORIDE, CALCIUM CHLORIDE 600; 310; 30; 20 MG/100ML; MG/100ML; MG/100ML; MG/100ML
125 INJECTION, SOLUTION INTRAVENOUS CONTINUOUS
OUTPATIENT
Start: 2023-09-05

## 2023-09-05 RX ORDER — METHYLERGONOVINE MALEATE 0.2 MG/ML
200 INJECTION INTRAVENOUS ONCE AS NEEDED
OUTPATIENT
Start: 2023-09-05

## 2023-09-05 RX ORDER — OXYTOCIN/0.9 % SODIUM CHLORIDE 30/500 ML
650 PLASTIC BAG, INJECTION (ML) INTRAVENOUS ONCE
OUTPATIENT
Start: 2023-09-05 | End: 2023-09-05

## 2023-09-05 RX ORDER — OXYTOCIN/0.9 % SODIUM CHLORIDE 30/500 ML
85 PLASTIC BAG, INJECTION (ML) INTRAVENOUS ONCE
OUTPATIENT
Start: 2023-09-05 | End: 2023-09-05

## 2023-09-05 RX ORDER — CITRIC ACID/SODIUM CITRATE 334-500MG
30 SOLUTION, ORAL ORAL ONCE
OUTPATIENT
Start: 2023-09-05 | End: 2023-09-05

## 2023-09-05 RX ORDER — SODIUM CHLORIDE 0.9 % (FLUSH) 0.9 %
10 SYRINGE (ML) INJECTION AS NEEDED
OUTPATIENT
Start: 2023-09-05

## 2023-09-05 RX ORDER — LIDOCAINE HYDROCHLORIDE 10 MG/ML
0.5 INJECTION, SOLUTION EPIDURAL; INFILTRATION; INTRACAUDAL; PERINEURAL ONCE AS NEEDED
OUTPATIENT
Start: 2023-09-05

## 2023-09-05 RX ORDER — SODIUM CHLORIDE 0.9 % (FLUSH) 0.9 %
10 SYRINGE (ML) INJECTION EVERY 12 HOURS SCHEDULED
OUTPATIENT
Start: 2023-09-05

## 2023-09-05 NOTE — TELEPHONE ENCOUNTER
Hub staff attempted to follow warm transfer process and was unsuccessful     Caller: Emma Fernandez    Relationship to patient: Self    Best call back number: [phone] CALL ANYTIME, IT IS OKAY TO LVM.    Patient is needing: PATIENT IS SCHEDULED FOR OB FOLLOW UP WITH  ON 09/12/23 AT 8:50 AM AND PREADMISSION TESTING AT 3:30 PM. PATIENT LIVES AN HOUR AWAY AND WOULD LIKE APPTS CLOSER TOGETHER.   PATIENT CALLED PAT TO RESCHEDULE FOR A MORNING APPT AND WAS TOLD TO CALL OFFICE.

## 2023-09-05 NOTE — PROGRESS NOTES
Patient denies bleeding, leaking fluid or contractions  NIPT declined  Dr. Garza follows for GDM, patient states she is diet controlled.  Patients next follow up with Dr. Colorado is today

## 2023-09-05 NOTE — PROGRESS NOTES
OB FOLLOW UP  CC- Here for care of pregnancy        Emma Fernandez is a 28 y.o.  36w5d patient being seen today for her obstetrical follow up visit. Patient reports swelling in the upper and lower extremities. It is Non-Pitting. She also reports pain in hands that is usually worse in the mornings.    Her prenatal care is complicated by (and status) : GDM diet controlled. Her average fasting BG is 70. Her average 2hr PP BG is 100-110  Patient Active Problem List   Diagnosis    Endometriosis    Prenatal care, antepartum    High risk pregnancy due to assisted reproductive technology    Marginal placenta previa    Diet controlled gestational diabetes mellitus (GDM) in third trimester    Maternal anemia in pregnancy, antepartum    Anxiety    Excessive fetal growth affecting management of pregnancy in third trimester       GBS Status: Done Today. She is not allergic to PCN.    No Known Allergies     Her Delivery Plan is: Undecided    US today: yes  Non Stress Test: No.    ROS -   Patient Reports :  See above  Patient Denies: Loss of Fluid, Vaginal Spotting, Vision Changes, Headaches, Contractions, and Epigastric pain  Fetal Movement : normal  All other systems reviewed and are negative.       The additional following portions of the patient's history were reviewed and updated as appropriate: allergies, current medications, past family history, past medical history, past social history, past surgical history, and problem list.    I have reviewed and agree with the HPI, ROS, and historical information as entered above. Florence Colorado MD        EXAM:     Prenatal Vitals  BP: 108/68  Weight: 64.6 kg (142 lb 6.4 oz)   Fetal Heart Rate: usg              Urine Glucose Read-only: Negative  Urine Protein Read-only: Negative           Assessment and Plan    Problem List Items Addressed This Visit          Endocrine and Metabolic    Diet controlled gestational diabetes mellitus (GDM) in third trimester    Overview      147 and 28 weeks.  Failed 3hour. Will set up with endocrine            Gravid and     Prenatal care, antepartum - Primary    High risk pregnancy due to assisted reproductive technology    Overview     IVF by Dr. Hill in in [place]  Recommend baby aspirin weeks 12 through 36  We will have [facility] perform fetal echo secondary to IVF pregnancy.         Marginal placenta previa    Overview     Posterior.  Seen at 19+4 weeks.  Still present at 23 weeks at [facility].      [facility] at 32wks previa still present. Repeat scan at [facility] at 36w            Hematology and Neoplasia    Maternal anemia in pregnancy, antepartum    Overview     Hemoglobin 8.4 at 28 weeks.  Advised twice daily iron supplementation.         Relevant Medications    Ferrous Sulfate ER (Slow Fe) 142 (45 Fe) MG tablet controlled-release     Other Visit Diagnoses       Prenatal care in third trimester        Relevant Orders    Group B Streptococcus Culture - Swab, Vaginal/Rectum    POC Urinalysis Dipstick (Completed)            Pregnancy at 36w5d  Fetal status reassuring.   Reviewed FSBS goals: fasting <90, 2hr PP < 120  Reviewed Pre-eclampsia signs/symptoms  Delivery options reviewed with patient  Signs of labor reviewed  Kick counts reviewed  Activity and Exercise discussed.  Return in about 1 week (around 2023).    Florence Colorado MD  2023

## 2023-09-08 LAB — BACTERIA SPEC AEROBE CULT: NORMAL

## 2023-09-12 ENCOUNTER — ROUTINE PRENATAL (OUTPATIENT)
Dept: OBSTETRICS AND GYNECOLOGY | Facility: CLINIC | Age: 28
End: 2023-09-12
Payer: MEDICAID

## 2023-09-12 ENCOUNTER — PRE-ADMISSION TESTING (OUTPATIENT)
Dept: PREADMISSION TESTING | Facility: HOSPITAL | Age: 28
End: 2023-09-12
Payer: MEDICAID

## 2023-09-12 VITALS — BODY MASS INDEX: 27.02 KG/M2 | SYSTOLIC BLOOD PRESSURE: 116 MMHG | DIASTOLIC BLOOD PRESSURE: 82 MMHG | WEIGHT: 143 LBS

## 2023-09-12 VITALS — HEIGHT: 61 IN | BODY MASS INDEX: 27.18 KG/M2 | WEIGHT: 143.96 LBS

## 2023-09-12 DIAGNOSIS — Z34.90 PRENATAL CARE, ANTEPARTUM: Primary | ICD-10-CM

## 2023-09-12 DIAGNOSIS — O24.410 DIET CONTROLLED GESTATIONAL DIABETES MELLITUS (GDM) IN THIRD TRIMESTER: ICD-10-CM

## 2023-09-12 DIAGNOSIS — O36.63X0 EXCESSIVE FETAL GROWTH AFFECTING MANAGEMENT OF PREGNANCY IN THIRD TRIMESTER, SINGLE OR UNSPECIFIED FETUS: ICD-10-CM

## 2023-09-12 DIAGNOSIS — O09.819 HIGH RISK PREGNANCY DUE TO ASSISTED REPRODUCTIVE TECHNOLOGY: ICD-10-CM

## 2023-09-12 DIAGNOSIS — O44.20 MARGINAL PLACENTA PREVIA: ICD-10-CM

## 2023-09-12 DIAGNOSIS — O99.019 MATERNAL ANEMIA IN PREGNANCY, ANTEPARTUM: ICD-10-CM

## 2023-09-12 LAB
ABO GROUP BLD: NORMAL
BLD GP AB SCN SERPL QL: NEGATIVE
DEPRECATED RDW RBC AUTO: 56.1 FL (ref 37–54)
ERYTHROCYTE [DISTWIDTH] IN BLOOD BY AUTOMATED COUNT: 18.1 % (ref 12.3–15.4)
GLUCOSE SERPL-MCNC: 136 MG/DL (ref 65–99)
GLUCOSE UR STRIP-MCNC: NEGATIVE MG/DL
HCT VFR BLD AUTO: 37.7 % (ref 34–46.6)
HGB BLD-MCNC: 12.3 G/DL (ref 12–15.9)
MCH RBC QN AUTO: 27.9 PG (ref 26.6–33)
MCHC RBC AUTO-ENTMCNC: 32.6 G/DL (ref 31.5–35.7)
MCV RBC AUTO: 85.5 FL (ref 79–97)
PLATELET # BLD AUTO: 227 10*3/MM3 (ref 140–450)
PMV BLD AUTO: 9.5 FL (ref 6–12)
PROT UR STRIP-MCNC: NEGATIVE MG/DL
RBC # BLD AUTO: 4.41 10*6/MM3 (ref 3.77–5.28)
RH BLD: POSITIVE
T&S EXPIRATION DATE: NORMAL
WBC NRBC COR # BLD: 6.14 10*3/MM3 (ref 3.4–10.8)

## 2023-09-12 PROCEDURE — 86850 RBC ANTIBODY SCREEN: CPT

## 2023-09-12 PROCEDURE — 86901 BLOOD TYPING SEROLOGIC RH(D): CPT

## 2023-09-12 PROCEDURE — 86900 BLOOD TYPING SEROLOGIC ABO: CPT

## 2023-09-12 PROCEDURE — 85027 COMPLETE CBC AUTOMATED: CPT

## 2023-09-12 PROCEDURE — 82947 ASSAY GLUCOSE BLOOD QUANT: CPT

## 2023-09-12 PROCEDURE — 36415 COLL VENOUS BLD VENIPUNCTURE: CPT

## 2023-09-12 NOTE — DISCHARGE INSTRUCTIONS
What to know before your arrive:    -Do not eat, drink or chew gum beginning 8 hours before your scheduled arrival time to the hospital.  Except please drink 20 ounces of Gatorade and complete two hours before your given arrival time to the hospital.  If you drink too close to surgery time, your sugery may  be delayed or cancelled.  Please complete as instructed.     -Do not shave any part of your body including abdomen or pelvic are for two days before your procedure.  -If you are taking a scheduled medication (insulin, blood pressure medicine,antibiotics) please consult with your physician whether to take on the day of surgery.  -Remove all jewelry including rings, wedding bands, and piercing before coming to the hospital.  -Leave important valuables at home.  -Do not wear dark fingernail polish.  -Please take two Tylenol 500 mg tablets the evening before surgery.  -Bring the following with you to the hospital:    -Picture ID and insurance, Medicare or Medicaid cards    -Co-pay/deductible required by insurance (Cash, Check, Credit Card)    -Copy of living will or power  document (if applicable)    -CPAP mask and tubing, not machine (if applicable)    -Skin prep instructions sheet    What to know the day of procedure:    -Park in the [facility], take elevator for first floor, exit to the right and  proceed through the doors to outside, follow the covered sidewalk to the entrance of the [facility], follow the [facility] and [facility], enter the [place] [facility] to your right BEFORE entering the [address] lobby.  Take the elevators to the 3rd floor ([address] [place] [facility]).  -Leave unnecessary items in your vehicle, including your suitcase.  Your support  person or a family member can get it for you after your procedure.   -Check in at the reception desk in the lobby of the 3rd floor ([address] [place]).   -One person may accompany you to the pre-op/recovery area.  Please have  other family members wait in the  waiting room.   -An anesthesiologist will meet with your prior to your procedure.   -After anesthesia has been initiated, one person may accompany you in the  operating room.   -No video cameras are permitted in the operating room; only still cameras,  Please.      What to expect while you are in recovery:     -One person may stay with you while you are in recovery.   -If the baby is stable, he/she may visit to initiate breastfeeding & [facility].      CHLORHEXIDINE GLUCONATE WIPES AND INSTRUCTIONS GIVEN TO PATIENT

## 2023-09-12 NOTE — PAT
An arrival time for procedure was not provided during PAT visit. If patient had any questions or concerns about their arrival time, they were instructed to contact their surgeon/physician.  Additionally, if the patient referred to an arrival time that was acquired from their my chart account, patient was encouraged to verify that time with their surgeon/physician. Arrival times are NOT provided in Pre Admission Testing Department.    Blood bank bracelet applied to patient during Pre Admission Testing visit.  Patient instructed not to remove from arm until after procedure and they are discharged from the hospital.  Explained to patient that they may shower and get the bracelet wet, but not to immerse under water for longer periods (bathing, swimming, hand dishwashing, etc).  Patient verbalized understanding.    Patient instructed to drink 20 ounces of Gatorade and it needs to be completed 1 hour (for Main OR patients) or 2 hours (scheduled  section & [facility] patients) before given arrival time for procedure (NO RED Gatorade)    Patient verbalized understanding.    Patient denies any current skin issues.     Instructed patient to take two Tylenol extra strength (total of 1000 mg) the night before surgery.    Patient to apply Chlorhexadine wipes  to surgical area (as instructed) the night before procedure and the AM of procedure. Wipes provided.    Patient viewed general PAT education video as instructed in their preoperative information received from their surgeon.  Patient stated the general PAT education video was viewed in its entirety and survey completed.  Copies of [facility] general education handouts (Incentive Spirometry, Meds to Beds Program, Patient Belongings, Pre-op skin preparation instructions, Blood Glucose testing, Visitor policy, Surgery FAQ, Code H) distributed to patient if not printed. Education related to the PAT pass and skin preparation for surgery (if applicable) completed in PAT as a  reinforcement to PAT education video. Patient instructed to return PAT pass provided today as well as completed skin preparation sheet (if applicable) on the day of procedure.     Additionally if patient had not viewed video yet but intended to view it at home or in our waiting area, then referred them to the handout with QR code/link provided during PAT visit.  Instructed patient to complete survey after viewing the video in its entirety.  Encouraged patient/family to read PAT general education handouts thoroughly and notify PAT staff with any questions or concerns. Patient verbalized understanding of all information and priority content.

## 2023-09-13 ENCOUNTER — ANESTHESIA EVENT (OUTPATIENT)
Dept: LABOR AND DELIVERY | Facility: HOSPITAL | Age: 28
End: 2023-09-13
Payer: MEDICAID

## 2023-09-14 ENCOUNTER — ANESTHESIA (OUTPATIENT)
Dept: LABOR AND DELIVERY | Facility: HOSPITAL | Age: 28
End: 2023-09-14
Payer: MEDICAID

## 2023-09-14 ENCOUNTER — HOSPITAL ENCOUNTER (INPATIENT)
Facility: HOSPITAL | Age: 28
LOS: 3 days | Discharge: HOME | End: 2023-09-17
Attending: OBSTETRICS & GYNECOLOGY | Admitting: OBSTETRICS & GYNECOLOGY
Payer: MEDICAID

## 2023-09-14 DIAGNOSIS — O24.410 DIET CONTROLLED GESTATIONAL DIABETES MELLITUS (GDM) IN THIRD TRIMESTER: ICD-10-CM

## 2023-09-14 LAB
ABO GROUP BLD: NORMAL
AMPHET+METHAMPHET UR QL: NEGATIVE
AMPHETAMINES UR QL: NEGATIVE
BARBITURATES UR QL SCN: NEGATIVE
BENZODIAZ UR QL SCN: NEGATIVE
BUPRENORPHINE SERPL-MCNC: NEGATIVE NG/ML
CANNABINOIDS SERPL QL: NEGATIVE
COCAINE UR QL: NEGATIVE
FENTANYL UR-MCNC: NEGATIVE NG/ML
GLUCOSE BLDC GLUCOMTR-MCNC: 70 MG/DL (ref 70–130)
METHADONE UR QL SCN: NEGATIVE
OPIATES UR QL: NEGATIVE
OXYCODONE UR QL SCN: NEGATIVE
PCP UR QL SCN: NEGATIVE
PROPOXYPH UR QL: NEGATIVE
RH BLD: POSITIVE
TRICYCLICS UR QL SCN: NEGATIVE

## 2023-09-14 PROCEDURE — 25010000002 KETOROLAC TROMETHAMINE PER 15 MG: Performed by: OBSTETRICS & GYNECOLOGY

## 2023-09-14 PROCEDURE — 80307 DRUG TEST PRSMV CHEM ANLYZR: CPT | Performed by: OBSTETRICS & GYNECOLOGY

## 2023-09-14 PROCEDURE — 59514 CESAREAN DELIVERY ONLY: CPT | Performed by: OBSTETRICS & GYNECOLOGY

## 2023-09-14 PROCEDURE — 86900 BLOOD TYPING SEROLOGIC ABO: CPT

## 2023-09-14 PROCEDURE — 25010000002 FENTANYL CITRATE (PF) 100 MCG/2ML SOLUTION: Performed by: NURSE ANESTHETIST, CERTIFIED REGISTERED

## 2023-09-14 PROCEDURE — 25010000002 ONDANSETRON PER 1 MG: Performed by: NURSE ANESTHETIST, CERTIFIED REGISTERED

## 2023-09-14 PROCEDURE — 0 MORPHINE PER 10 MG: Performed by: NURSE ANESTHETIST, CERTIFIED REGISTERED

## 2023-09-14 PROCEDURE — 94799 UNLISTED PULMONARY SVC/PX: CPT

## 2023-09-14 PROCEDURE — 59025 FETAL NON-STRESS TEST: CPT

## 2023-09-14 PROCEDURE — C1765 ADHESION BARRIER: HCPCS | Performed by: OBSTETRICS & GYNECOLOGY

## 2023-09-14 PROCEDURE — 25010000002 MIDAZOLAM PER 1 MG: Performed by: NURSE ANESTHETIST, CERTIFIED REGISTERED

## 2023-09-14 PROCEDURE — 82948 REAGENT STRIP/BLOOD GLUCOSE: CPT

## 2023-09-14 PROCEDURE — 25010000002 CEFAZOLIN IN DEXTROSE 2-4 GM/100ML-% SOLUTION: Performed by: OBSTETRICS & GYNECOLOGY

## 2023-09-14 PROCEDURE — 25010000002 METOCLOPRAMIDE PER 10 MG: Performed by: NURSE ANESTHETIST, CERTIFIED REGISTERED

## 2023-09-14 PROCEDURE — 86901 BLOOD TYPING SEROLOGIC RH(D): CPT

## 2023-09-14 DEVICE — ABSORBABLE ADHESION BARRIER
Type: IMPLANTABLE DEVICE | Site: UTERUS | Status: FUNCTIONAL
Brand: GYNECARE INTERCEED

## 2023-09-14 DEVICE — HEMOST ABS SURGICEL SNOW 4X4IN: Type: IMPLANTABLE DEVICE | Site: UTERUS | Status: FUNCTIONAL

## 2023-09-14 RX ORDER — PROMETHAZINE HYDROCHLORIDE 12.5 MG/1
12.5 SUPPOSITORY RECTAL EVERY 6 HOURS PRN
Status: DISCONTINUED | OUTPATIENT
Start: 2023-09-14 | End: 2023-09-17 | Stop reason: HOSPADM

## 2023-09-14 RX ORDER — SODIUM CHLORIDE 0.9 % (FLUSH) 0.9 %
10 SYRINGE (ML) INJECTION EVERY 12 HOURS SCHEDULED
Status: DISCONTINUED | OUTPATIENT
Start: 2023-09-14 | End: 2023-09-17 | Stop reason: HOSPADM

## 2023-09-14 RX ORDER — SODIUM CHLORIDE 0.9 % (FLUSH) 0.9 %
10 SYRINGE (ML) INJECTION EVERY 12 HOURS SCHEDULED
Status: DISCONTINUED | OUTPATIENT
Start: 2023-09-14 | End: 2023-09-14 | Stop reason: HOSPADM

## 2023-09-14 RX ORDER — IBUPROFEN 600 MG/1
600 TABLET ORAL EVERY 6 HOURS
Status: DISCONTINUED | OUTPATIENT
Start: 2023-09-15 | End: 2023-09-17 | Stop reason: HOSPADM

## 2023-09-14 RX ORDER — ONDANSETRON 4 MG/1
4 TABLET, FILM COATED ORAL EVERY 8 HOURS PRN
Status: DISCONTINUED | OUTPATIENT
Start: 2023-09-14 | End: 2023-09-17 | Stop reason: HOSPADM

## 2023-09-14 RX ORDER — FENTANYL CITRATE 50 UG/ML
INJECTION, SOLUTION INTRAMUSCULAR; INTRAVENOUS AS NEEDED
Status: DISCONTINUED | OUTPATIENT
Start: 2023-09-14 | End: 2023-09-14 | Stop reason: SURG

## 2023-09-14 RX ORDER — MISOPROSTOL 200 UG/1
800 TABLET ORAL AS NEEDED
Status: DISCONTINUED | OUTPATIENT
Start: 2023-09-14 | End: 2023-09-14 | Stop reason: HOSPADM

## 2023-09-14 RX ORDER — BUPIVACAINE HYDROCHLORIDE 7.5 MG/ML
INJECTION, SOLUTION INTRASPINAL AS NEEDED
Status: DISCONTINUED | OUTPATIENT
Start: 2023-09-14 | End: 2023-09-14 | Stop reason: SURG

## 2023-09-14 RX ORDER — SODIUM CHLORIDE 0.9 % (FLUSH) 0.9 %
10 SYRINGE (ML) INJECTION AS NEEDED
Status: DISCONTINUED | OUTPATIENT
Start: 2023-09-14 | End: 2023-09-14 | Stop reason: HOSPADM

## 2023-09-14 RX ORDER — ONDANSETRON 2 MG/ML
INJECTION INTRAMUSCULAR; INTRAVENOUS AS NEEDED
Status: DISCONTINUED | OUTPATIENT
Start: 2023-09-14 | End: 2023-09-14 | Stop reason: SURG

## 2023-09-14 RX ORDER — DIPHENHYDRAMINE HYDROCHLORIDE 50 MG/ML
25 INJECTION INTRAMUSCULAR; INTRAVENOUS EVERY 4 HOURS PRN
Status: DISCONTINUED | OUTPATIENT
Start: 2023-09-14 | End: 2023-09-17 | Stop reason: HOSPADM

## 2023-09-14 RX ORDER — SODIUM CHLORIDE 9 MG/ML
40 INJECTION, SOLUTION INTRAVENOUS AS NEEDED
Status: DISCONTINUED | OUTPATIENT
Start: 2023-09-14 | End: 2023-09-17 | Stop reason: HOSPADM

## 2023-09-14 RX ORDER — MISOPROSTOL 200 UG/1
600 TABLET ORAL AS NEEDED
Status: DISCONTINUED | OUTPATIENT
Start: 2023-09-14 | End: 2023-09-17 | Stop reason: HOSPADM

## 2023-09-14 RX ORDER — CEFAZOLIN SODIUM 2 G/100ML
2 INJECTION, SOLUTION INTRAVENOUS ONCE
Status: COMPLETED | OUTPATIENT
Start: 2023-09-14 | End: 2023-09-14

## 2023-09-14 RX ORDER — ONDANSETRON 2 MG/ML
4 INJECTION INTRAMUSCULAR; INTRAVENOUS ONCE AS NEEDED
Status: ACTIVE | OUTPATIENT
Start: 2023-09-14 | End: 2023-09-15

## 2023-09-14 RX ORDER — SIMETHICONE 80 MG
80 TABLET,CHEWABLE ORAL 4 TIMES DAILY PRN
Status: DISCONTINUED | OUTPATIENT
Start: 2023-09-14 | End: 2023-09-17 | Stop reason: HOSPADM

## 2023-09-14 RX ORDER — KETOROLAC TROMETHAMINE 30 MG/ML
30 INJECTION, SOLUTION INTRAMUSCULAR; INTRAVENOUS ONCE
Status: COMPLETED | OUTPATIENT
Start: 2023-09-14 | End: 2023-09-14

## 2023-09-14 RX ORDER — DOCUSATE SODIUM 100 MG/1
100 CAPSULE, LIQUID FILLED ORAL 2 TIMES DAILY PRN
Status: DISCONTINUED | OUTPATIENT
Start: 2023-09-14 | End: 2023-09-17 | Stop reason: HOSPADM

## 2023-09-14 RX ORDER — DIPHENHYDRAMINE HYDROCHLORIDE 50 MG/ML
25 INJECTION INTRAMUSCULAR; INTRAVENOUS ONCE AS NEEDED
Status: DISCONTINUED | OUTPATIENT
Start: 2023-09-14 | End: 2023-09-17 | Stop reason: HOSPADM

## 2023-09-14 RX ORDER — CARBOPROST TROMETHAMINE 250 UG/ML
250 INJECTION, SOLUTION INTRAMUSCULAR AS NEEDED
Status: DISCONTINUED | OUTPATIENT
Start: 2023-09-14 | End: 2023-09-14 | Stop reason: HOSPADM

## 2023-09-14 RX ORDER — CITRIC ACID/SODIUM CITRATE 334-500MG
30 SOLUTION, ORAL ORAL ONCE
Status: COMPLETED | OUTPATIENT
Start: 2023-09-14 | End: 2023-09-14

## 2023-09-14 RX ORDER — OXYTOCIN/0.9 % SODIUM CHLORIDE 30/500 ML
650 PLASTIC BAG, INJECTION (ML) INTRAVENOUS ONCE
Status: DISCONTINUED | OUTPATIENT
Start: 2023-09-14 | End: 2023-09-14 | Stop reason: HOSPADM

## 2023-09-14 RX ORDER — PROMETHAZINE HYDROCHLORIDE 25 MG/1
25 TABLET ORAL EVERY 6 HOURS PRN
Status: DISCONTINUED | OUTPATIENT
Start: 2023-09-14 | End: 2023-09-17 | Stop reason: HOSPADM

## 2023-09-14 RX ORDER — METHYLERGONOVINE MALEATE 0.2 MG/ML
200 INJECTION INTRAVENOUS ONCE AS NEEDED
Status: DISCONTINUED | OUTPATIENT
Start: 2023-09-14 | End: 2023-09-14 | Stop reason: HOSPADM

## 2023-09-14 RX ORDER — PRENATAL VIT/IRON FUM/FOLIC AC 27MG-0.8MG
1 TABLET ORAL DAILY
Status: DISCONTINUED | OUTPATIENT
Start: 2023-09-14 | End: 2023-09-17 | Stop reason: HOSPADM

## 2023-09-14 RX ORDER — SODIUM CHLORIDE 0.9 % (FLUSH) 0.9 %
1-10 SYRINGE (ML) INJECTION AS NEEDED
Status: DISCONTINUED | OUTPATIENT
Start: 2023-09-14 | End: 2023-09-17 | Stop reason: HOSPADM

## 2023-09-14 RX ORDER — SODIUM CHLORIDE, SODIUM LACTATE, POTASSIUM CHLORIDE, CALCIUM CHLORIDE 600; 310; 30; 20 MG/100ML; MG/100ML; MG/100ML; MG/100ML
125 INJECTION, SOLUTION INTRAVENOUS CONTINUOUS
Status: DISCONTINUED | OUTPATIENT
Start: 2023-09-14 | End: 2023-09-17 | Stop reason: HOSPADM

## 2023-09-14 RX ORDER — ACETAMINOPHEN 500 MG
1000 TABLET ORAL EVERY 6 HOURS
Status: COMPLETED | OUTPATIENT
Start: 2023-09-14 | End: 2023-09-15

## 2023-09-14 RX ORDER — ACETAMINOPHEN 500 MG
500 TABLET ORAL ONCE
COMMUNITY
End: 2023-09-17

## 2023-09-14 RX ORDER — METOCLOPRAMIDE HYDROCHLORIDE 5 MG/ML
INJECTION INTRAMUSCULAR; INTRAVENOUS AS NEEDED
Status: DISCONTINUED | OUTPATIENT
Start: 2023-09-14 | End: 2023-09-14 | Stop reason: SURG

## 2023-09-14 RX ORDER — NALOXONE HCL 0.4 MG/ML
0.4 VIAL (ML) INJECTION
Status: ACTIVE | OUTPATIENT
Start: 2023-09-14 | End: 2023-09-15

## 2023-09-14 RX ORDER — KETOROLAC TROMETHAMINE 15 MG/ML
15 INJECTION, SOLUTION INTRAMUSCULAR; INTRAVENOUS EVERY 6 HOURS
Status: COMPLETED | OUTPATIENT
Start: 2023-09-14 | End: 2023-09-15

## 2023-09-14 RX ORDER — OXYCODONE HYDROCHLORIDE 10 MG/1
10 TABLET ORAL EVERY 4 HOURS PRN
Status: DISCONTINUED | OUTPATIENT
Start: 2023-09-14 | End: 2023-09-17 | Stop reason: HOSPADM

## 2023-09-14 RX ORDER — FAMOTIDINE 10 MG/ML
INJECTION, SOLUTION INTRAVENOUS AS NEEDED
Status: DISCONTINUED | OUTPATIENT
Start: 2023-09-14 | End: 2023-09-14 | Stop reason: SURG

## 2023-09-14 RX ORDER — MORPHINE SULFATE 0.5 MG/ML
INJECTION, SOLUTION EPIDURAL; INTRATHECAL; INTRAVENOUS AS NEEDED
Status: DISCONTINUED | OUTPATIENT
Start: 2023-09-14 | End: 2023-09-14 | Stop reason: SURG

## 2023-09-14 RX ORDER — LIDOCAINE HYDROCHLORIDE 10 MG/ML
0.5 INJECTION, SOLUTION EPIDURAL; INFILTRATION; INTRACAUDAL; PERINEURAL ONCE AS NEEDED
Status: DISCONTINUED | OUTPATIENT
Start: 2023-09-14 | End: 2023-09-14 | Stop reason: HOSPADM

## 2023-09-14 RX ORDER — ACETAMINOPHEN 325 MG/1
650 TABLET ORAL EVERY 6 HOURS
Status: DISCONTINUED | OUTPATIENT
Start: 2023-09-15 | End: 2023-09-17 | Stop reason: HOSPADM

## 2023-09-14 RX ORDER — MIDAZOLAM HYDROCHLORIDE 1 MG/ML
INJECTION INTRAMUSCULAR; INTRAVENOUS AS NEEDED
Status: DISCONTINUED | OUTPATIENT
Start: 2023-09-14 | End: 2023-09-14 | Stop reason: SURG

## 2023-09-14 RX ORDER — ALUMINA, MAGNESIA, AND SIMETHICONE 2400; 2400; 240 MG/30ML; MG/30ML; MG/30ML
15 SUSPENSION ORAL EVERY 4 HOURS PRN
Status: DISCONTINUED | OUTPATIENT
Start: 2023-09-14 | End: 2023-09-17 | Stop reason: HOSPADM

## 2023-09-14 RX ORDER — ACETAMINOPHEN 500 MG
1000 TABLET ORAL ONCE
Status: COMPLETED | OUTPATIENT
Start: 2023-09-14 | End: 2023-09-14

## 2023-09-14 RX ORDER — CALCIUM CARBONATE 500 MG/1
1 TABLET, CHEWABLE ORAL EVERY 4 HOURS PRN
Status: DISCONTINUED | OUTPATIENT
Start: 2023-09-14 | End: 2023-09-17 | Stop reason: HOSPADM

## 2023-09-14 RX ORDER — DIPHENHYDRAMINE HCL 25 MG
25 CAPSULE ORAL EVERY 4 HOURS PRN
Status: DISCONTINUED | OUTPATIENT
Start: 2023-09-14 | End: 2023-09-17 | Stop reason: HOSPADM

## 2023-09-14 RX ORDER — SODIUM CHLORIDE 9 MG/ML
40 INJECTION, SOLUTION INTRAVENOUS AS NEEDED
Status: DISCONTINUED | OUTPATIENT
Start: 2023-09-14 | End: 2023-09-14 | Stop reason: HOSPADM

## 2023-09-14 RX ORDER — OXYCODONE HYDROCHLORIDE 5 MG/1
5 TABLET ORAL EVERY 4 HOURS PRN
Status: DISCONTINUED | OUTPATIENT
Start: 2023-09-14 | End: 2023-09-17 | Stop reason: HOSPADM

## 2023-09-14 RX ORDER — HYDROCORTISONE 25 MG/G
1 CREAM TOPICAL AS NEEDED
Status: DISCONTINUED | OUTPATIENT
Start: 2023-09-14 | End: 2023-09-17 | Stop reason: HOSPADM

## 2023-09-14 RX ORDER — OXYTOCIN/0.9 % SODIUM CHLORIDE 30/500 ML
PLASTIC BAG, INJECTION (ML) INTRAVENOUS AS NEEDED
Status: DISCONTINUED | OUTPATIENT
Start: 2023-09-14 | End: 2023-09-14 | Stop reason: SURG

## 2023-09-14 RX ORDER — METHYLERGONOVINE MALEATE 0.2 MG/ML
200 INJECTION INTRAVENOUS AS NEEDED
Status: DISCONTINUED | OUTPATIENT
Start: 2023-09-14 | End: 2023-09-17 | Stop reason: HOSPADM

## 2023-09-14 RX ORDER — CARBOPROST TROMETHAMINE 250 UG/ML
250 INJECTION, SOLUTION INTRAMUSCULAR AS NEEDED
Status: DISCONTINUED | OUTPATIENT
Start: 2023-09-14 | End: 2023-09-17 | Stop reason: HOSPADM

## 2023-09-14 RX ORDER — OXYTOCIN/0.9 % SODIUM CHLORIDE 30/500 ML
85 PLASTIC BAG, INJECTION (ML) INTRAVENOUS ONCE
Status: DISCONTINUED | OUTPATIENT
Start: 2023-09-14 | End: 2023-09-14 | Stop reason: HOSPADM

## 2023-09-14 RX ADMIN — MORPHINE SULFATE 150 MCG: 0.5 INJECTION, SOLUTION EPIDURAL; INTRATHECAL; INTRAVENOUS at 07:44

## 2023-09-14 RX ADMIN — SODIUM CHLORIDE, POTASSIUM CHLORIDE, SODIUM LACTATE AND CALCIUM CHLORIDE 1000 ML/HR: 600; 310; 30; 20 INJECTION, SOLUTION INTRAVENOUS at 07:24

## 2023-09-14 RX ADMIN — FENTANYL CITRATE 20 MCG: 50 INJECTION, SOLUTION INTRAMUSCULAR; INTRAVENOUS at 07:44

## 2023-09-14 RX ADMIN — SIMETHICONE 80 MG: 80 TABLET, CHEWABLE ORAL at 22:24

## 2023-09-14 RX ADMIN — ACETAMINOPHEN 1000 MG: 500 TABLET ORAL at 06:58

## 2023-09-14 RX ADMIN — FAMOTIDINE 20 MG: 10 INJECTION, SOLUTION INTRAVENOUS at 07:39

## 2023-09-14 RX ADMIN — BUPIVACAINE HYDROCHLORIDE IN DEXTROSE 1.3 ML: 7.5 INJECTION, SOLUTION SUBARACHNOID at 07:44

## 2023-09-14 RX ADMIN — Medication 500 ML: at 08:09

## 2023-09-14 RX ADMIN — SODIUM CITRATE AND CITRIC ACID MONOHYDRATE 30 ML: 500; 334 SOLUTION ORAL at 07:33

## 2023-09-14 RX ADMIN — SODIUM CHLORIDE, POTASSIUM CHLORIDE, SODIUM LACTATE AND CALCIUM CHLORIDE 125 ML/HR: 600; 310; 30; 20 INJECTION, SOLUTION INTRAVENOUS at 09:54

## 2023-09-14 RX ADMIN — ACETAMINOPHEN 1000 MG: 500 TABLET ORAL at 12:57

## 2023-09-14 RX ADMIN — SODIUM CHLORIDE, POTASSIUM CHLORIDE, SODIUM LACTATE AND CALCIUM CHLORIDE 1000 ML: 600; 310; 30; 20 INJECTION, SOLUTION INTRAVENOUS at 06:55

## 2023-09-14 RX ADMIN — METOCLOPRAMIDE 10 MG: 5 INJECTION, SOLUTION INTRAMUSCULAR; INTRAVENOUS at 08:13

## 2023-09-14 RX ADMIN — ONDANSETRON 4 MG: 2 INJECTION INTRAMUSCULAR; INTRAVENOUS at 07:39

## 2023-09-14 RX ADMIN — ACETAMINOPHEN 1000 MG: 500 TABLET ORAL at 18:31

## 2023-09-14 RX ADMIN — SODIUM CHLORIDE, POTASSIUM CHLORIDE, SODIUM LACTATE AND CALCIUM CHLORIDE: 600; 310; 30; 20 INJECTION, SOLUTION INTRAVENOUS at 08:03

## 2023-09-14 RX ADMIN — KETOROLAC TROMETHAMINE 15 MG: 15 INJECTION, SOLUTION INTRAMUSCULAR; INTRAVENOUS at 22:24

## 2023-09-14 RX ADMIN — CEFAZOLIN SODIUM 2 G: 2 INJECTION, SOLUTION INTRAVENOUS at 07:26

## 2023-09-14 RX ADMIN — KETOROLAC TROMETHAMINE 15 MG: 15 INJECTION, SOLUTION INTRAMUSCULAR; INTRAVENOUS at 16:08

## 2023-09-14 RX ADMIN — DOCUSATE SODIUM 100 MG: 100 CAPSULE, LIQUID FILLED ORAL at 22:24

## 2023-09-14 RX ADMIN — MIDAZOLAM HYDROCHLORIDE 1 MG: 1 INJECTION, SOLUTION INTRAMUSCULAR; INTRAVENOUS at 07:39

## 2023-09-14 RX ADMIN — KETOROLAC TROMETHAMINE 30 MG: 30 INJECTION, SOLUTION INTRAMUSCULAR at 09:55

## 2023-09-14 NOTE — ANESTHESIA PREPROCEDURE EVALUATION
Anesthesia Evaluation     Patient summary reviewed and Nursing notes reviewed   NPO Solid Status: > 8 hours  NPO Liquid Status: > 2 hours           Airway   Mallampati: II  TM distance: >3 FB  Neck ROM: full  Dental      Pulmonary - negative pulmonary ROS   Cardiovascular - negative cardio ROS        Neuro/Psych- negative ROS  GI/Hepatic/Renal/Endo    (+) diabetes mellitus gestational    Musculoskeletal (-) negative ROS    Abdominal    Substance History - negative use     OB/GYN    (+) Pregnant        Other - negative ROS                     Anesthesia Plan    ASA 2     ITN and spinal       Anesthetic plan, risks, benefits, and alternatives have been provided, discussed and informed consent has been obtained with: patient.    Use of blood products discussed with patient .    Plan discussed with attending.    CODE STATUS:

## 2023-09-14 NOTE — OP NOTE
[facility]   Section Operative Note    Pre-Operative Dx:   1.  IUP at 38w0d  weeks     2. Unfavorable cervix  Suspect LGA, gestational diabetes        Postoperative dx:    1.  Same     Procedure: Procedure(s):   SECTION PRIMARY   Surgeon: Florence Colorado MD    Assistant: Surgeon(s):  Florence Colorado MD        Anesthesia: Spinal    EBL:   mls.  525  mls.         IV Fluids: 1700 mls.   UOP: 200 mls.       Antibiotics: cefazolin (Ancef)     Infant:            Gender: female  infant    Weight: 3890 g (8 lb 9.2 oz)     Apgars: 8  @ 1 minute /     9  @ 5 minutes    Fetal presentation: cephalic   Amniotic fluid: Clear      Complications:   None      Disposition:   Mother to Mother Baby/Postpartum  in stable condition currently.   Baby to NBN  in stable condition currently.       Procedure:   The patient was taken to the operating room where spinal anesthesia was placed, and she was placed in supine position with a leftward tilt. Sequential compression devices on bilateral lower extremities and a Mcguire catheter placed in her bladder. After being prepped and draped in a normal sterile fashion, a Pfannenstiel skin incision was made with a scalpel and taken down to the level of the fascia using the Bovie. The fascia was incised in the midline and extended laterally. The superior edge of the fascia was grasped with Kocher clamps, elevated and the rectus muscle dissected off. In a similar fashion, the inferior edge of the fascia was grasped with Kocher clamps, elevated and the rectus muscles dissected off. The rectus muscles were bisected in the midline. The peritoneum was identified, grasped and entered into sharply using Metzenbaum scissors. This incision was extended superiorly and inferiorly with good visualization of the bladder. Bladder blade was placed. A bladder flap was created. A low transverse uterine incision was made with a scalpel and extended laterally. Amniotomy with clear fluid  occurred at the time of hysterotomy. The head was brought to the uterine incision, and using fundal pressure, the head delivered without complication. Nose and mouth were bulb suctioned.  Shoulders and body were to follow. Cord was milked x4, clamped x2 and cut. Infant was handed to the awaiting pediatric team.  Cord blood was obtained. The placenta was manually extracted. The uterus was exteriorized and cleared of all clot and debris and the hysterotomy site was closed with a 1-0 chromic in a running locked fashion starting at the left angle and moving towards the right. Copious irrigation behind the uterus ensued.  Bowel noted to be attached to the fundus of the uterus from previous myomectomy.  Dissection occurred to remove bowel.  Surgicel placed over the frayed uterine wall after the dissection.  The uterus was returned to the abdominal cavity. Paracolic gutters were cleared of all clot and debris. The hysterotomy site was noted to be hemostatic as well as the bladder flap and Interceed was placed over this. The peritoneum was reapproximated using a 2-0 chromic in a running fashion starting superiorly and moving inferiorly. Irrigation over the rectus muscles then occurred with Bovie cauterization of any bleeding sites. The fascia was reapproximated using 0 Vicryl in a running fashion starting at the left angle and moving towards the right.  The subcutaneous fat layer was hemostatic and closed in an interrupted fashion with 2-0 Plain.  The skin was reapproximated with a 4-0 vicryl on a Daljit needle. All sponge, lap, and needle counts were correct x2. The patient was taken to the recovery room in stable condition.             Florence Colorado MD  9/14/2023  09:16 EDT

## 2023-09-14 NOTE — PLAN OF CARE
Problem: Adult Inpatient Plan of Care  Goal: Absence of Hospital-Acquired Illness or Injury  Intervention: Prevent and Manage VTE (Venous Thromboembolism) Risk  Recent Flowsheet Documentation  Taken 2023 0849 by Starr Meza RN  VTE Prevention/Management:   bilateral   sequential compression devices on  Taken 2023 0715 by Starr Meza, RN  VTE Prevention/Management:   bilateral   sequential compression devices on     Problem: Bleeding ( Delivery)  Goal: Bleeding is Controlled  Outcome: Met     Problem: Change in Fetal Wellbeing ( Delivery)  Goal: Stable Fetal Wellbeing  Outcome: Met     Problem: Infection ( Delivery)  Goal: Absence of Infection Signs and Symptoms  Outcome: Met     Problem: Respiratory Compromise ( Delivery)  Goal: Effective Oxygenation and Ventilation  Outcome: Met   Goal Outcome Evaluation:

## 2023-09-14 NOTE — H&P
History and Physical:    Subjective     Chief Complaint   Patient presents with    Scheduled        Emma Fernandez is a 28 y.o. year old  with an Estimated Date of Delivery: 23 currently at 38w0d presenting with  gestational diabetes and LGA fetus.  Baby measuring 8 pounds 14 ounces at 36 weeks and 5 days with  diagnostic center.  Her gestational diabetes has been diet controlled, however secondary to fetal macrosomia, PDC recommended delivery at 38 weeks.  Discussed mode of delivery and patient elected for out right  section. .    Prenatal care has been with Florence Colorado MD.  It has been significant for  gestational diabetes, suspect LGA fetus. .      Review of Systems   Constitutional: Negative.    HENT: Negative.     Respiratory: Negative.     Cardiovascular: Negative.    Gastrointestinal: Negative.    Genitourinary: Negative.    Musculoskeletal: Negative.    Skin: Negative.    Allergic/Immunologic: Negative.    Neurological: Negative.    Hematological: Negative.    Psychiatric/Behavioral: Negative.           Past Medical History:   Diagnosis Date    Endometriosis 2022    Gestational diabetes     History of migraine     History of uterine fibroid     Had endometriosis surgery a year ago, removed fibroids    [place] product of in vitro fertilization (IVF) pregnancy 2023     Past Surgical History:   Procedure Laterality Date    APPENDECTOMY  2022    DILATATION AND CURETTAGE  2022    endometriosis removal in [place]    LAPAROSCOPIC APPENDECTOMY      OTHER SURGICAL HISTORY Left     REROUTED TUBE FROM KIDNEY TO THE BLADDER D/T KIDNEY REFLUX    OVARIAN CYST SURGERY  2022    PELVIC LAPAROSCOPY  2022    WISDOM TOOTH EXTRACTION       Family History   Problem Relation Age of Onset    Colon cancer Father         Rectal Cancer    Diabetes Maternal Grandmother     Prostate cancer Paternal Grandfather      "Breast cancer Neg Hx     Ovarian cancer Neg Hx     Uterine cancer Neg Hx      Social History     Tobacco Use    Smoking status: Never    Smokeless tobacco: Never   Vaping Use    Vaping Use: Never used   Substance Use Topics    Alcohol use: Not Currently    Drug use: Never     Medications Prior to Admission   Medication Sig Dispense Refill Last Dose    acetaminophen (TYLENOL) 500 MG tablet Take 1 tablet by mouth 1 (One) Time.   2023    Acetone, Urine, Test (Ketone Test) strip 1 each by In Vitro route Daily. 50 strip 2 2023    Blood Glucose Monitoring Suppl w/Device kit 1 each Daily. Please dispense any meter, strips and lancets that are formulary 1 each 0 2023    docusate sodium (COLACE) 50 MG capsule    2023    Ferrous Sulfate ER (Slow Fe) 142 (45 Fe) MG tablet controlled-release    2023    Glucose Blood (Blood Glucose Test) strip Test blood sugars  each 3 2023    Lancets misc Test blood sugars  each 5 2023    Prenatal Vit-Fe Fumarate-FA (PRENATAL PO) Take  by mouth.   2023     Allergies:  Patient has no known allergies.  OB History    Para Term  AB Living   1 0 0 0 0 0   SAB IAB Ectopic Molar Multiple Live Births   0 0 0 0 0 0      # Outcome Date GA Lbr Kong/2nd Weight Sex Delivery Anes PTL Lv   1 Current               Obstetric Comments   Fob #1 - Pregnancy #1     IVF - Frozen - 2022   Transfer 2023                 Objective     /97 (BP Location: Left arm, Patient Position: Lying)   Pulse 110   Temp 98.6 °F (37 °C) (Oral)   Resp 16   Ht 154.9 cm (61\")   Wt 65.3 kg (144 lb)   LMP 10/24/2022 (Exact Date)   Breastfeeding Yes   BMI 27.21 kg/m²     Physical Exam    General:  No acute distress   Lung: Clear to auscultation bilaterally, no wheezing, clear at bases   Heart: Regular rate and rhythm, no murmurs    Abdomen: Gravid, nontender   Extremities: 2+ DTR's bilaterally, no edema   FHT's: reactive    Cervix: Deferred   Zortman: " Contraction are none           Lab Review   External Prenatal Results       Pregnancy Outside Results - Transcribed From Office Records - See Scanned Records For Details       Test Value Date Time    ABO  A  09/12/23 1242    Rh  Positive  09/12/23 1242    Antibody Screen  Negative  09/12/23 1242       Negative  07/10/23 0947       Negative  03/01/23 1118    Varicella IgG       Rubella  3.48 index 03/01/23 1118    Hgb  12.3 g/dL 09/12/23 1242       8.4 g/dL 07/10/23 0947       12.9 g/dL 03/01/23 1118    Hct  37.7 % 09/12/23 1242       26.0 % 07/10/23 0947       38.3 % 03/01/23 1118    Glucose Fasting GTT  74 mg/dL 07/13/23 0956    Glucose Tolerance Test 1 hour  195 mg/dL 07/13/23 0956    Glucose Tolerance Test 3 hour  132 mg/dL 07/13/23 0956    Gonorrhea (discrete)  Negative  03/01/23 1118    Chlamydia (discrete)  Negative  03/01/23 1118    RPR  Non Reactive  03/01/23 1118    VDRL       Syphilis Antibody       HBsAg  Negative  03/01/23 1118    Herpes Simplex Virus PCR       Herpes Simplex VIrus Culture       HIV  Non Reactive  03/01/23 1118    Hep C RNA Quant PCR       Hep C Antibody  Non Reactive  03/01/23 1118    AFP       Group B Strep  No Group B Streptococcus isolated  09/05/23 1902    GBS Susceptibility to Clindamycin       GBS Susceptibility to Erythromycin       Fetal Fibronectin       Genetic Testing, Maternal Blood                 Drug Screening       Test Value Date Time    Urine Drug Screen       Amphetamine Screen       Barbiturate Screen       Benzodiazepine Screen       Methadone Screen       Phencyclidine Screen       Opiates Screen       THC Screen       Cocaine Screen       Propoxyphene Screen       Buprenorphine Screen       Methamphetamine Screen       Oxycodone Screen       Tricyclic Antidepressants Screen                 Legend    ^: Historical                                No results found for: ALKPHOS, ALT, AST, CREATININE, BILITOT, LDH, URICACID  Lab Results   Component Value Date    WBC  6.14 2023    HGB 12.3 2023    HCT 37.7 2023    MCV 85.5 2023     2023        No results found for this or any previous visit.              Patient Active Problem List    Diagnosis Date Noted    *Pregnancy 2023    Excessive fetal growth affecting management of pregnancy in third trimester 2023     Note Last Updated: 2023     On 2023-[facility] ultrasound at 36 weeks and 5 days demonstrated greater than 4000 g.  AC was greater than 99th percentile.  Discussion with Dr. Ann, patient, and her .  Mode of delivery was decided for out right  section as patient is closed, with a small pelvis, and LGA fetus concern for shoulder dystocia.     scheduled 2023 at 7:30 AM.  Orders in.      Diet controlled gestational diabetes mellitus (GDM) in third trimester 2023     Note Last Updated: 2023     147 and 28 weeks.  Failed 3hour.  Endocrine following.  Good blood sugars, however LGA fetus noted on ultrasound at 36 weeks and 5 days.      Maternal anemia in pregnancy, antepartum 2023     Note Last Updated: 2023     Hemoglobin 8.4 at 28 weeks.  Advised twice daily iron supplementation.      Marginal placenta previa 2023     Note Last Updated: 2023     Posterior.  Seen at 19+4 weeks.  Still present at 23 weeks at PDC.      PDC at 32wks previa still present. Repeat scan at [facility] at 36w resolved      Prenatal care, antepartum 2023    High risk pregnancy due to assisted reproductive technology 2023     Note Last Updated: 3/1/2023     IVF by Dr. Hill in in [place]  Recommend baby aspirin weeks 12 through 36  We will have [facility] perform fetal echo secondary to IVF pregnancy.      Endometriosis 2022     Note Last Updated: 2022-laparoscopy by RAMONA Valdivia in [place], demonstrated stage III endometriosis      Anxiety 2021        Assessment & Plan     ASSESSMENT  IUP at  38w0d  A1 diabetes  Suspect LGA  scheduled  section   3. GBBSnegative    PLAN  Admit to labor and delivery   2.   Proceed with primary  section.  Watch for atony secondary to LGA fetus.         Florence Colorado MD  2023@

## 2023-09-14 NOTE — PAYOR COMM NOTE
"Emma Fernandez (28 y.o. Female)     [place] Medicaid ID#[id number]    Delivery information.    From:Renée Spencer LPN, Utilization Review  Phone #[phone]  Fax #[phone]        Date of Birth   1995    Social Security Number       Address   [address] [place] [address]    Home Phone   [phone]    MRN   [id number]       Hinduism   None    Marital Status                               Admission Date   9/14/23    Admission Type   Elective    Admitting Provider   Florence Colorado MD    Attending Provider   Florence Colorado MD    Department, Room/Bed   [facility] LABOR DELIVERY, LTP7/7       Discharge Date       Discharge Disposition       Discharge Destination                                 Attending Provider: Florence Colorado MD    Allergies: No Known Allergies    Isolation: None   Infection: None   Code Status: Not on file    Ht: 154.9 cm (61\")   Wt: 65.3 kg (144 lb)    Admission Cmt: None   Principal Problem: Pregnancy [Z34.90]                   Active Insurance as of 9/14/2023       Primary Coverage       Payor Plan Insurance Group Employer/Plan Group    ANTHEM MEDICAID ANTHEM MEDICAID [id number]       Payor Plan Address Payor Plan Phone Number Payor Plan Fax Number Effective Dates    [address] [address] [phone]  8/1/2017 - None Entered    [facility] [id number]         Subscriber Name Subscriber Birth Date Member ID       EMMA FERNANDEZ 1995 [id number]                     Emergency Contacts        (Rel.) Home Phone Work Phone Mobile Phone    SHERYL FERNANDEZ (Spouse) [phone] -- [phone]              Insurance Information                  ANTHEM MEDICAID/ANTHEM MEDICAID Phone: [phone]    Subscriber: Emma Fernandez Subscriber#: [id number]    Group#: [id number] Precert#: --             History & Physical        Florence Colorado MD at 09/14/23 0731          History and " Physical:    Subjective    Chief Complaint   Patient presents with    Scheduled        Emma Fernandez is a 28 y.o. year old  with an Estimated Date of Delivery: 23 currently at 38w0d presenting with  gestational diabetes and LGA fetus.  Baby measuring 8 pounds 14 ounces at 36 weeks and 5 days with  diagnostic center.  Her gestational diabetes has been diet controlled, however secondary to fetal macrosomia, PDC recommended delivery at 38 weeks.  Discussed mode of delivery and patient elected for out right  section. .    Prenatal care has been with Florence Colorado MD.  It has been significant for  gestational diabetes, suspect LGA fetus. .      Review of Systems   Constitutional: Negative.    HENT: Negative.     Respiratory: Negative.     Cardiovascular: Negative.    Gastrointestinal: Negative.    Genitourinary: Negative.    Musculoskeletal: Negative.    Skin: Negative.    Allergic/Immunologic: Negative.    Neurological: Negative.    Hematological: Negative.    Psychiatric/Behavioral: Negative.           Past Medical History:   Diagnosis Date    Endometriosis 2022    Gestational diabetes     History of migraine     History of uterine fibroid     Had endometriosis surgery a year ago, removed fibroids     product of in vitro fertilization (IVF) pregnancy 2023     Past Surgical History:   Procedure Laterality Date    APPENDECTOMY  2022    DILATATION AND CURETTAGE  2022    endometriosis removal in [place]    LAPAROSCOPIC APPENDECTOMY      OTHER SURGICAL HISTORY Left     REROUTED TUBE FROM KIDNEY TO THE BLADDER D/T KIDNEY REFLUX    OVARIAN CYST SURGERY  2022    PELVIC LAPAROSCOPY  2022    WISDOM TOOTH EXTRACTION       Family History   Problem Relation Age of Onset    Colon cancer Father         Rectal Cancer    Diabetes Maternal Grandmother     Prostate cancer Paternal Grandfather     Breast cancer Neg  "Hx     Ovarian cancer Neg Hx     Uterine cancer Neg Hx      Social History     Tobacco Use    Smoking status: Never    Smokeless tobacco: Never   Vaping Use    Vaping Use: Never used   Substance Use Topics    Alcohol use: Not Currently    Drug use: Never     Medications Prior to Admission   Medication Sig Dispense Refill Last Dose    acetaminophen (TYLENOL) 500 MG tablet Take 1 tablet by mouth 1 (One) Time.   2023    Acetone, Urine, Test (Ketone Test) strip 1 each by In Vitro route Daily. 50 strip 2 2023    Blood Glucose Monitoring Suppl w/Device kit 1 each Daily. Please dispense any meter, strips and lancets that are formulary 1 each 0 2023    docusate sodium (COLACE) 50 MG capsule    2023    Ferrous Sulfate ER (Slow Fe) 142 (45 Fe) MG tablet controlled-release    2023    Glucose Blood (Blood Glucose Test) strip Test blood sugars  each 3 2023    Lancets misc Test blood sugars  each 5 2023    Prenatal Vit-Fe Fumarate-FA (PRENATAL PO) Take  by mouth.   2023     Allergies:  Patient has no known allergies.  OB History    Para Term  AB Living   1 0 0 0 0 0   SAB IAB Ectopic Molar Multiple Live Births   0 0 0 0 0 0      # Outcome Date GA Lbr Kong/2nd Weight Sex Delivery Anes PTL Lv   1 Current               Obstetric Comments   Fob #1 - Pregnancy #1     IVF - Frozen - 2022   Transfer 2023                 Objective    /97 (BP Location: Left arm, Patient Position: Lying)   Pulse 110   Temp 98.6 °F (37 °C) (Oral)   Resp 16   Ht 154.9 cm (61\")   Wt 65.3 kg (144 lb)   LMP 10/24/2022 (Exact Date)   Breastfeeding Yes   BMI 27.21 kg/m²     Physical Exam    General:  No acute distress   Lung: Clear to auscultation bilaterally, no wheezing, clear at bases   Heart: Regular rate and rhythm, no murmurs    Abdomen: Gravid, nontender   Extremities: 2+ DTR's bilaterally, no edema   FHT's: reactive    Cervix: Deferred   Lyndon: Contraction are none "           Lab Review   External Prenatal Results       Pregnancy Outside Results - Transcribed From Office Records - See Scanned Records For Details       Test Value Date Time    ABO  A  09/12/23 1242    Rh  Positive  09/12/23 1242    Antibody Screen  Negative  09/12/23 1242       Negative  07/10/23 0947       Negative  03/01/23 1118    Varicella IgG       Rubella  3.48 index 03/01/23 1118    Hgb  12.3 g/dL 09/12/23 1242       8.4 g/dL 07/10/23 0947       12.9 g/dL 03/01/23 1118    Hct  37.7 % 09/12/23 1242       26.0 % 07/10/23 0947       38.3 % 03/01/23 1118    Glucose Fasting GTT  74 mg/dL 07/13/23 0956    Glucose Tolerance Test 1 hour  195 mg/dL 07/13/23 0956    Glucose Tolerance Test 3 hour  132 mg/dL 07/13/23 0956    Gonorrhea (discrete)  Negative  03/01/23 1118    Chlamydia (discrete)  Negative  03/01/23 1118    RPR  Non Reactive  03/01/23 1118    VDRL       Syphilis Antibody       HBsAg  Negative  03/01/23 1118    Herpes Simplex Virus PCR       Herpes Simplex VIrus Culture       HIV  Non Reactive  03/01/23 1118    Hep C RNA Quant PCR       Hep C Antibody  Non Reactive  03/01/23 1118    AFP       Group B Strep  No Group B Streptococcus isolated  09/05/23 1902    GBS Susceptibility to Clindamycin       GBS Susceptibility to Erythromycin       Fetal Fibronectin       Genetic Testing, Maternal Blood                 Drug Screening       Test Value Date Time    Urine Drug Screen       Amphetamine Screen       Barbiturate Screen       Benzodiazepine Screen       Methadone Screen       Phencyclidine Screen       Opiates Screen       THC Screen       Cocaine Screen       Propoxyphene Screen       Buprenorphine Screen       Methamphetamine Screen       Oxycodone Screen       Tricyclic Antidepressants Screen                 Legend    ^: Historical                                No results found for: ALKPHOS, ALT, AST, CREATININE, BILITOT, LDH, URICACID  Lab Results   Component Value Date    WBC 6.14 09/12/2023    HGB  12.3 2023    HCT 37.7 2023    MCV 85.5 2023     2023        No results found for this or any previous visit.              Patient Active Problem List    Diagnosis Date Noted    *Pregnancy 2023    Excessive fetal growth affecting management of pregnancy in third trimester 2023     Note Last Updated: 2023     On 2023-PDC ultrasound at 36 weeks and 5 days demonstrated greater than 4000 g.  AC was greater than 99th percentile.  Discussion with Dr. Ann, patient, and her .  Mode of delivery was decided for out right  section as patient is closed, with a small pelvis, and LGA fetus concern for shoulder dystocia.     scheduled 2023 at 7:30 AM.  Orders in.      Diet controlled gestational diabetes mellitus (GDM) in third trimester 2023     Note Last Updated: 2023     147 and 28 weeks.  Failed 3hour.  Endocrine following.  Good blood sugars, however LGA fetus noted on ultrasound at 36 weeks and 5 days.      Maternal anemia in pregnancy, antepartum 2023     Note Last Updated: 2023     Hemoglobin 8.4 at 28 weeks.  Advised twice daily iron supplementation.      Marginal placenta previa 2023     Note Last Updated: 2023     Posterior.  Seen at 19+4 weeks.  Still present at 23 weeks at PDC.      PDC at 32wks previa still present. Repeat scan at [facility] at 36w resolved      Prenatal care, antepartum 2023    High risk pregnancy due to assisted reproductive technology 2023     Note Last Updated: 3/1/2023     IVF by Dr. Hill in in [place]  Recommend baby aspirin weeks 12 through 36  We will have PDC perform fetal echo secondary to IVF pregnancy.      Endometriosis 2022     Note Last Updated: 2022-laparoscopy by RAMONA Valdivia in [place], demonstrated stage III endometriosis      Anxiety 2021        Assessment & Plan    ASSESSMENT  IUP at 38w0d  A1 diabetes  Suspect  LGA  scheduled  section   3. GBBSnegative    PLAN  Admit to labor and delivery   2.   Proceed with primary  section.  Watch for atony secondary to LGA fetus.         Florence Colorado MD  2023@    Electronically signed by Florence Colorado MD at 23 0736       Facility-Administered Medications as of 2023   Medication Dose Route Frequency Provider Last Rate Last Admin    [COMPLETED] acetaminophen (TYLENOL) tablet 1,000 mg  1,000 mg Oral Once Florence Colorado MD   1,000 mg at 23 0658    carboprost (HEMABATE) injection 250 mcg  250 mcg Intramuscular PRN Florence Colorado MD        [COMPLETED] ceFAZolin in dextrose (ANCEF) IVPB solution 2 g  2 g Intravenous Once Florence Colorado MD   2 g at 23 0726    [COMPLETED] ketorolac (TORADOL) injection 30 mg  30 mg Intravenous Once Florence Colorado MD   30 mg at 23 0955    [COMPLETED] lactated ringers bolus 1,000 mL  1,000 mL Intravenous Once Florence Colorado MD 2,000 mL/hr at 23 0655 1,000 mL at 23 0655    lactated ringers infusion  125 mL/hr Intravenous Continuous Florence Colorado  mL/hr at 23 0954 125 mL/hr at 23 0954    lidocaine PF 1% (XYLOCAINE) injection 0.5 mL  0.5 mL Intradermal Once PRN Florence Colorado MD        methylergonovine (METHERGINE) injection 200 mcg  200 mcg Intramuscular Once PRN Florence Colorado MD        miSOPROStol (CYTOTEC) tablet 800 mcg  800 mcg Rectal PRN Florence Colorado MD        oxytocin (PITOCIN) 30 units in 0.9% sodium chloride 500 mL (premix)  650 mL/hr Intravenous Once Florence Colorado MD        Followed by    oxytocin (PITOCIN) 30 units in 0.9% sodium chloride 500 mL (premix)  85 mL/hr Intravenous Once Florence Colorado MD        [COMPLETED] Sod Citrate-Citric Acid (BICITRA) oral solution 30 mL  30 mL Oral Once Florence Colorado MD   30 mL at 23 9730    sodium chloride 0.9 % flush 10  mL  10 mL Intravenous Q12H Florence Colorado MD        sodium chloride 0.9 % flush 10 mL  10 mL Intravenous Florence Church MD        sodium chloride 0.9 % infusion 40 mL  40 mL Intravenous Florence Church MD         Orders (last 24 hrs)        Start     Ordered    09/14/23 1030  oxytocin (PITOCIN) 30 units in 0.9% sodium chloride 500 mL (premix)  Once        See Hyperspace for full Linked Orders Report.    09/14/23 0936    09/14/23 1030  oxytocin (PITOCIN) 30 units in 0.9% sodium chloride 500 mL (premix)  Once        See Hyperspace for full Linked Orders Report.    09/14/23 0936    09/14/23 1030  ketorolac (TORADOL) injection 30 mg  Once         09/14/23 0936    09/14/23 1000  Respirations  Every Hour      Comments: If respiratory rate is less than 10/min, notify the Anesthesiologist    09/14/23 0936    09/14/23 0937  Vital Signs  Per Hospital Policy         09/14/23 0936    09/14/23 0937  If Respiratory Rate is Less Than 8/Min, See Narcan Order. Notify Anesthesiologist STAT.  Continuous         09/14/23 0936    09/14/23 0937  Blood Pressure and Pulse Every 4 Hours  Continuous         09/14/23 0936    09/14/23 0937  Activity & Removal of Mcguire Catheter, Per Obstetrician  Continuous         09/14/23 0936    09/14/23 0937  Notify Anesthesiologist for Any Questions / Problems  Continuous         09/14/23 0936    09/14/23 0937  Notify Anesthesia for Temp Over 101.4F  Continuous        Comments: May discharge from PACU with temperature at or above 95 degrees.    09/14/23 0936    09/14/23 0937  Ambu Bag at Bedside  Continuous         09/14/23 0936    09/14/23 0937  Notify Provider (Specified)  Until Discontinued         09/14/23 0936    09/14/23 0937  Vital Signs Per Hospital Policy  Per Hospital Policy         09/14/23 0936    09/14/23 0937  Strict Bed Rest  Until Discontinued         09/14/23 0936    09/14/23 0937  Fundal & Lochia Check  Per Order Details        Comments: Every 15 Minutes x4,  Then Every 30 Minutes x2, Then Every Shift    09/14/23 0936    09/14/23 0937  Diet: Regular/House Diet; Texture: Regular Texture (IDDSI 7); Fluid Consistency: Thin (IDDSI 0)  Diet Effective Now         09/14/23 0936    09/14/23 0936  methylergonovine (METHERGINE) injection 200 mcg  Once As Needed         09/14/23 0936    09/14/23 0936  carboprost (HEMABATE) injection 250 mcg  As Needed         09/14/23 0936    09/14/23 0936  miSOPROStol (CYTOTEC) tablet 800 mcg  As Needed         09/14/23 0936    09/14/23 0936  Fundal & Lochia Check  Every Shift       09/14/23 0936    09/14/23 0900  sodium chloride 0.9 % flush 10 mL  Every 12 Hours Scheduled         09/14/23 0617    09/14/23 0900  Transfer Patient  Once         09/14/23 0914    09/14/23 0728  Fentanyl, Urine - Urine, Clean Catch  Once         09/14/23 0727    09/14/23 0715  lactated ringers bolus 1,000 mL  Once         09/14/23 0617    09/14/23 0715  lactated ringers infusion  Continuous         09/14/23 0617    09/14/23 0715  Sod Citrate-Citric Acid (BICITRA) oral solution 30 mL  Once         09/14/23 0617    09/14/23 0715  acetaminophen (TYLENOL) tablet 1,000 mg  Once         09/14/23 0617    09/14/23 0715  ceFAZolin in dextrose (ANCEF) IVPB solution 2 g  Once         09/14/23 0617    09/14/23 0706  POC Glucose Once  PROCEDURE ONCE        Comments: Complete no more than 45 minutes prior to patient eating      09/14/23 0703    09/14/23 0625  ABO/Rh Specimen Verification  Once         09/14/23 0624    09/14/23 0618  Admit To Obstetrics Inpatient  Once         09/14/23 0617    09/14/23 0618  Vital Signs Per Hospital Policy  Per Hospital Policy         09/14/23 0617    09/14/23 0618  Continuous Fetal Monitoring With NST on Admission and Prior to Initiation of Oxytocin.  Per Order Details        Comments: Continuous Fetal Monitoring With NST on Admission    09/14/23 0617    09/14/23 0618  External Uterine Contraction Monitoring  Per Hospital Policy         09/14/23  23  Notify Provider (Specified)  Until Discontinued         23  Notify Provider of Tachysystole (Per Hospital Algorithm)  Until Discontinued         23  Notify Provider if Membranes Ruptured, Bleeding Greater Than 1 Pad Per Hour, Fetal Heart Tone Abnormality or Severe Pain  Until Discontinued         23  Initiate Group Beta Strep (GBS) Prophylaxis Protocol, If Criteria Met  Continuous        Comments: NO TREATMENT RECOMMENDED IF: 1) Maternal GBS Status Known Negative 2) Scheduled  Birth With Intact Membranes, Not in Labor 3) Maternal GBS Status Unknown, No Risk Factors  TREAT WITH ANTIBIOTICS IF:  1) Maternal GBS Status Known Positive 2) Maternal GBS Status Unknown With Risk Factors: a)  Previous Infant Affected By GBS Infection b) GBS Urinary Tract Infection (UTI) or Bacteriuria During Pregnancy c) Unexplained Maternal Fever (100.4F (38C) or Greater) During Labor d)  Prolonged Rupture of Membranes (18 or More Hours) e) Gestational Age Less Than 37 Weeks    23  Insert Indwelling Urinary Catheter  Once        See [facility] for full Linked Orders Report.    23  Assess Need for Indwelling Urinary Catheter - Follow Removal Protocol  Continuous        Comments: Indwelling Urinary Catheter Removal Criteria  Discontinue Indwelling Urinary Catheter Unless One of the Following is Present:  Urinary Retention or Obstruction  Chronic Urinary Catheter Use  End of Life  Critical Illness with Strict I/O   Tract or Abdominal Surgery  Stage 3/4 Sacral / Perineal Wound  Required Activity Restriction: Trauma  Required Activity Restriction: Spine Surgery  If Patient is Being Followed by Urology Contact Them PRIOR to Removal  Do Not Remove Indwelling Urinary Catheter Order is Present with a CLINICAL REASON to Maintain the Catheter. Provider is Required to Include a  Clinical Reason to Maintain a Urinary Catheter    Patient Admitted With Indwelling Urinary Catheter (Not Placed at [facility])  Assess for Continued Need & Document Medical Necessity  If Infection is Suspected, Contact the Provider       See Hyperspace for full Linked Orders Report.    09/14/23 0617 09/14/23 0618  Abdominal Prep With Clippers  Once         09/14/23 0617    09/14/23 0618  Chlorhexadine Skin Prep Unless Otherwise Indicated  Once         09/14/23 0617    09/14/23 0618  SCD (Sequential Compression Devices)  Once         09/14/23 0617    09/14/23 0618  POC Glucose Once  Once         09/14/23 0617    09/14/23 0618  Document Gatorade Consumption Prior to Admission (Yes or No)  Once         09/14/23 0617    09/14/23 0618  NPO Diet NPO Type: Ice Chips  Diet Effective Now,   Status:  Canceled         09/14/23 0617 09/14/23 0618  Inpatient Anesthesiology Consult  Once        Specialty:  Anesthesiology  Provider:  (Not yet assigned)    09/14/23 0617    09/14/23 0618  Urine Drug Screen - Urine, Clean Catch  Once         09/14/23 0617    09/14/23 0618  Insert Peripheral IV  Once         09/14/23 0617    09/14/23 0618  Saline Lock & Maintain IV Access  Continuous         09/14/23 0617 09/14/23 0617  Urinary Catheter Care  Every Shift      See Hyperspace for full Linked Orders Report.    09/14/23 0617 09/14/23 0617  sodium chloride 0.9 % flush 10 mL  As Needed         09/14/23 0617 09/14/23 0617  sodium chloride 0.9 % infusion 40 mL  As Needed         09/14/23 0617    09/14/23 0617  lidocaine PF 1% (XYLOCAINE) injection 0.5 mL  Once As Needed         09/14/23 0617    Unscheduled  IV Site Care  As Needed       09/14/23 0936    Unscheduled  Blood Gas, Arterial -With Co-Ox Panel: Yes  As Needed       09/14/23 0936    --  acetaminophen (TYLENOL) 500 MG tablet  Once         09/14/23 0645    Signed and Held  ondansetron (ZOFRAN) injection 4 mg  Once As Needed         Signed and Held    Signed and  Held  diphenhydrAMINE (BENADRYL) capsule 25 mg  Every 4 Hours PRN        See Hyperspace for full Linked Orders Report.    Signed and Held    Signed and Held  diphenhydrAMINE (BENADRYL) injection 25 mg  Once As Needed        See Hyperspace for full Linked Orders Report.    Signed and Held    Signed and Held  diphenhydrAMINE (BENADRYL) injection 25 mg  Every 4 Hours PRN        See Hyperspace for full Linked Orders Report.    Signed and Held    Signed and Held  naloxone (NARCAN) injection 0.4 mg  Every 1 Hour PRN         Signed and Held                     Operative/Procedure Notes (last 24 hours)        Florence Colorado MD at 23 0915          See op note    Electronically signed by Florence Colorado MD at 23       Florence Colorado MD at 23 0757          [facility]   Section Operative Note    Pre-Operative Dx:   1.  IUP at 38w0d  weeks     2. Unfavorable cervix  Suspect LGA, gestational diabetes        Postoperative dx:    1.  Same     Procedure: Procedure(s):   SECTION PRIMARY   Surgeon: Florence Colorado MD    Assistant: Surgeon(s):  Florence Colorado MD        Anesthesia: Spinal    EBL:   mls.  525  mls.         IV Fluids: 1700 mls.   UOP: 200 mls.       Antibiotics: cefazolin (Ancef)     Infant:            Gender: female  infant    Weight: 3890 g (8 lb 9.2 oz)     Apgars: 8  @ 1 minute /     9  @ 5 minutes    Fetal presentation: cephalic   Amniotic fluid: Clear      Complications:   None      Disposition:   Mother to Mother Baby/Postpartum  in stable condition currently.   Baby to NBN  in stable condition currently.       Procedure:   The patient was taken to the operating room where spinal anesthesia was placed, and she was placed in supine position with a leftward tilt. Sequential compression devices on bilateral lower extremities and a Mcguire catheter placed in her bladder. After being prepped and draped in a normal sterile fashion, a Pfannenstiel  skin incision was made with a scalpel and taken down to the level of the fascia using the Bovie. The fascia was incised in the midline and extended laterally. The superior edge of the fascia was grasped with Kocher clamps, elevated and the rectus muscle dissected off. In a similar fashion, the inferior edge of the fascia was grasped with Kocher clamps, elevated and the rectus muscles dissected off. The rectus muscles were bisected in the midline. The peritoneum was identified, grasped and entered into sharply using Metzenbaum scissors. This incision was extended superiorly and inferiorly with good visualization of the bladder. Bladder blade was placed. A bladder flap was created. A low transverse uterine incision was made with a scalpel and extended laterally. Amniotomy with clear fluid occurred at the time of hysterotomy. The head was brought to the uterine incision, and using fundal pressure, the head delivered without complication. Nose and mouth were bulb suctioned.  Shoulders and body were to follow. Cord was milked x4, clamped x2 and cut. Infant was handed to the awaiting pediatric team.  Cord blood was obtained. The placenta was manually extracted. The uterus was exteriorized and cleared of all clot and debris and the hysterotomy site was closed with a 1-0 chromic in a running locked fashion starting at the left angle and moving towards the right. Copious irrigation behind the uterus ensued.  Bowel noted to be attached to the fundus of the uterus from previous myomectomy.  Dissection occurred to remove bowel.  Surgicel placed over the frayed uterine wall after the dissection.  The uterus was returned to the abdominal cavity. Paracolic gutters were cleared of all clot and debris. The hysterotomy site was noted to be hemostatic as well as the bladder flap and Interceed was placed over this. The peritoneum was reapproximated using a 2-0 chromic in a running fashion starting superiorly and moving inferiorly.  Irrigation over the rectus muscles then occurred with Bovie cauterization of any bleeding sites. The fascia was reapproximated using 0 Vicryl in a running fashion starting at the left angle and moving towards the right.  The subcutaneous fat layer was hemostatic and closed in an interrupted fashion with 2-0 Plain.  The skin was reapproximated with a 4-0 vicryl on a Daljit needle. All sponge, lap, and needle counts were correct x2. The patient was taken to the recovery room in stable condition.             Florence Colorado MD  9/14/2023  09:16 EDT        Electronically signed by Florence Colorado MD at 09/14/23 0918       Physician Progress Notes (last 24 hours)  Notes from 09/13/23 1042 through 09/14/23 1042   No notes of this type exist for this encounter.

## 2023-09-14 NOTE — ANESTHESIA PROCEDURE NOTES
Spinal Block      Patient reassessed immediately prior to procedure    Patient location during procedure: OR  Indication:at surgeon's request  Performed By  CRNA/RAUDEL: Claudette Garces CRNA  Preanesthetic Checklist  Completed: patient identified, IV checked, site marked, risks and benefits discussed, surgical consent, monitors and equipment checked, pre-op evaluation and timeout performed  Spinal Block Prep:  Patient Position:sitting  Sterile Tech:cap, gloves, mask and sterile barriers  Prep:Betadine  Patient Monitoring:blood pressure monitoring, continuous pulse oximetry and EKG    Spinal Block Procedure  Approach:midline  Guidance:palpation technique  Location:L3-L4  Needle Type:Mike  Needle Gauge:25 G  Placement of Spinal needle event:cerebrospinal fluid aspirated  Paresthesia: no  Fluid Appearance:clear     Post Assessment  Patient Tolerance:patient tolerated the procedure well with no apparent complications  Complications no

## 2023-09-14 NOTE — LACTATION NOTE
09/14/23 1510   Maternal Information   Date of Referral 09/14/23   Person Making Referral lactation consultant   Maternal Reason for Referral breastfeeding currently;no prior breastfeeding experience  (Mom reports infant is latching and nursing well.  Assisted with waking, positioning and latching infant in CC on right breast.  Infant nursed briefly but was very sleepy.  Breastfeeding education)   Infant Reason for Referral other (see comments)  (provided, information given. Mom has new pump from insurance. Encouraged her to call for lactation assistance prn.)   Maternal Assessment   Breast Size Issue none   Breast Shape Bilateral:;round   Breast Density Bilateral:;soft   Nipples Bilateral:;everted   Left Nipple Symptoms intact;nontender   Right Nipple Symptoms intact;nontender   Maternal Infant Feeding   Maternal Emotional State receptive;relaxed   Infant Positioning cross-cradle   Signs of Milk Transfer other (see comments)  (colostrum on nipple when infant released)   Pain with Feeding no   Comfort Measures Before/During Feeding infant position adjusted;latch adjusted;maternal position adjusted   Nipple Shape After Feeding, Left Breast round;symmetrical;appropriately projected   Nipple Shape After Feeding, Right round;symmetrical;appropriately projected   Latch Assistance none needed   Support Person Involvement actively supporting mother;verbally supports mother   Milk Expression/Equipment   Breast Pump Type double electric, personal   Equipment for Home Use breast pump ordered through insurance

## 2023-09-14 NOTE — ANESTHESIA POSTPROCEDURE EVALUATION
Patient: Emma Fernandez    Procedure Summary       Date: 23 Room / Location: [facility] LABOR DELIVERY   [facility]OR DELIVERY    Anesthesia Start: 735 Anesthesia Stop: 856    Procedure:  SECTION PRIMARY (Abdomen) Diagnosis:     Surgeons: Florence Colorado MD Provider: Valentin Angulo MD    Anesthesia Type: ITN, spinal ASA Status: 2            Anesthesia Type: ITN, spinal    Vitals  Vitals Value Taken Time   BP 95/65 23 0855   Temp 97.5 °F (36.4 °C) 23 0849   Pulse 85 23 0859   Resp 16 23 0849   SpO2 100 % 23 0859   Vitals shown include unvalidated device data.        Post Anesthesia Care and Evaluation    Patient location during evaluation: bedside  Patient participation: complete - patient participated  Level of consciousness: awake and alert  Pain management: adequate    Airway patency: patent  Anesthetic complications: No anesthetic complications    Cardiovascular status: acceptable  Respiratory status: acceptable  Hydration status: acceptable

## 2023-09-14 NOTE — PLAN OF CARE
Problem: Bleeding ( Delivery)  Goal: Bleeding is Controlled  Outcome: Met     Problem: Change in Fetal Wellbeing ( Delivery)  Goal: Stable Fetal Wellbeing  Outcome: Met     Problem: Infection ( Delivery)  Goal: Absence of Infection Signs and Symptoms  Outcome: Met     Problem: Respiratory Compromise ( Delivery)  Goal: Effective Oxygenation and Ventilation  Outcome: Met   Goal Outcome Evaluation:

## 2023-09-15 LAB
BASOPHILS # BLD AUTO: 0.03 10*3/MM3 (ref 0–0.2)
BASOPHILS NFR BLD AUTO: 0.4 % (ref 0–1.5)
DEPRECATED RDW RBC AUTO: 56.1 FL (ref 37–54)
EOSINOPHIL # BLD AUTO: 0.08 10*3/MM3 (ref 0–0.4)
EOSINOPHIL NFR BLD AUTO: 1.2 % (ref 0.3–6.2)
ERYTHROCYTE [DISTWIDTH] IN BLOOD BY AUTOMATED COUNT: 18.2 % (ref 12.3–15.4)
HCT VFR BLD AUTO: 33.3 % (ref 34–46.6)
HGB BLD-MCNC: 10.8 G/DL (ref 12–15.9)
IMM GRANULOCYTES # BLD AUTO: 0.04 10*3/MM3 (ref 0–0.05)
IMM GRANULOCYTES NFR BLD AUTO: 0.6 % (ref 0–0.5)
LYMPHOCYTES # BLD AUTO: 0.97 10*3/MM3 (ref 0.7–3.1)
LYMPHOCYTES NFR BLD AUTO: 14.1 % (ref 19.6–45.3)
MCH RBC QN AUTO: 27.8 PG (ref 26.6–33)
MCHC RBC AUTO-ENTMCNC: 32.4 G/DL (ref 31.5–35.7)
MCV RBC AUTO: 85.6 FL (ref 79–97)
MONOCYTES # BLD AUTO: 0.46 10*3/MM3 (ref 0.1–0.9)
MONOCYTES NFR BLD AUTO: 6.7 % (ref 5–12)
NEUTROPHILS NFR BLD AUTO: 5.32 10*3/MM3 (ref 1.7–7)
NEUTROPHILS NFR BLD AUTO: 77 % (ref 42.7–76)
NRBC BLD AUTO-RTO: 0 /100 WBC (ref 0–0.2)
PLATELET # BLD AUTO: 197 10*3/MM3 (ref 140–450)
PMV BLD AUTO: 9.4 FL (ref 6–12)
RBC # BLD AUTO: 3.89 10*6/MM3 (ref 3.77–5.28)
WBC NRBC COR # BLD: 6.9 10*3/MM3 (ref 3.4–10.8)

## 2023-09-15 PROCEDURE — 85025 COMPLETE CBC W/AUTO DIFF WBC: CPT | Performed by: OBSTETRICS & GYNECOLOGY

## 2023-09-15 PROCEDURE — 99231 SBSQ HOSP IP/OBS SF/LOW 25: CPT | Performed by: NURSE PRACTITIONER

## 2023-09-15 PROCEDURE — 25010000002 KETOROLAC TROMETHAMINE PER 15 MG: Performed by: OBSTETRICS & GYNECOLOGY

## 2023-09-15 RX ADMIN — ACETAMINOPHEN 1000 MG: 500 TABLET ORAL at 00:47

## 2023-09-15 RX ADMIN — DOCUSATE SODIUM 100 MG: 100 CAPSULE, LIQUID FILLED ORAL at 22:23

## 2023-09-15 RX ADMIN — SIMETHICONE 80 MG: 80 TABLET, CHEWABLE ORAL at 12:53

## 2023-09-15 RX ADMIN — PRENATAL VIT W/ FE FUMARATE-FA TAB 27-0.8 MG 1 TABLET: 27-0.8 TAB at 08:42

## 2023-09-15 RX ADMIN — Medication 1 APPLICATION: at 15:49

## 2023-09-15 RX ADMIN — SIMETHICONE 80 MG: 80 TABLET, CHEWABLE ORAL at 22:23

## 2023-09-15 RX ADMIN — KETOROLAC TROMETHAMINE 15 MG: 15 INJECTION, SOLUTION INTRAMUSCULAR; INTRAVENOUS at 10:20

## 2023-09-15 RX ADMIN — ACETAMINOPHEN 650 MG: 325 TABLET ORAL at 12:53

## 2023-09-15 RX ADMIN — SIMETHICONE 80 MG: 80 TABLET, CHEWABLE ORAL at 18:32

## 2023-09-15 RX ADMIN — KETOROLAC TROMETHAMINE 15 MG: 15 INJECTION, SOLUTION INTRAMUSCULAR; INTRAVENOUS at 04:11

## 2023-09-15 RX ADMIN — DOCUSATE SODIUM 100 MG: 100 CAPSULE, LIQUID FILLED ORAL at 08:44

## 2023-09-15 RX ADMIN — ACETAMINOPHEN 1000 MG: 500 TABLET ORAL at 06:31

## 2023-09-15 RX ADMIN — IBUPROFEN 600 MG: 600 TABLET ORAL at 15:49

## 2023-09-15 RX ADMIN — ACETAMINOPHEN 650 MG: 325 TABLET ORAL at 18:32

## 2023-09-15 RX ADMIN — SIMETHICONE 80 MG: 80 TABLET, CHEWABLE ORAL at 08:44

## 2023-09-15 RX ADMIN — IBUPROFEN 600 MG: 600 TABLET ORAL at 22:23

## 2023-09-15 NOTE — LACTATION NOTE
09/15/23 1700   Maternal Information   Date of Referral 09/15/23   Person Making Referral patient   Maternal Reason for Referral breastfeeding currently;no prior breastfeeding experience   Infant Reason for Referral disinterest in latch   Maternal Assessment   Breast Size Issue none   Breast Shape Bilateral:;round   Breast Density Bilateral:;soft   Nipples Bilateral:;everted   Left Nipple Symptoms tender;intact;redness  (soft shells and gel pad given)   Right Nipple Symptoms intact;tender;redness   Maternal Infant Feeding   Maternal Emotional State receptive   Infant Positioning clutch/football  (right - assisted in bringing infant on deeper.)   Signs of Milk Transfer deep jaw excursions noted   Pain with Feeding no   Comfort Measures Before/During Feeding infant position adjusted;maternal position adjusted   Nipple Shape After Feeding, Right symmetrical;round   Support Person Involvement verbally supports mother

## 2023-09-15 NOTE — PROGRESS NOTES
9/15/2023    Name:Emma Fernandez    MR#:[id number]     PROGRESS NOTE:  Post-Op 1 S/P    HD:1    Subjective   28 y.o. yo Female  s/p CS at 38w0d doing well. Pain well controlled. Tolerating regular diet and having flatus. Lochia normal.       Pregnancy    Delivery of pregnancy by  section       Objective    Vitals  Temp:  Temp:  [97.5 °F (36.4 °C)-98.9 °F (37.2 °C)] 98.3 °F (36.8 °C)  Temp src: Oral  BP:  BP: ()/(58-97) 111/71  Pulse:  Heart Rate:  [78-96] 87  RR:   Resp:  [16-20] 18    General Awake, alert, no distress  Abdomen Soft, non-distended, fundus firm, below umbilicus, appropriately tender  Incision  Intact, no erythema or exudate  Extremities Calves NT bilaterally     I/O last 3 completed shifts:  In: 1200 [I.V.:1200]  Out: 2225 [Urine:1700; Blood:525]    LABS:   Lab Results   Component Value Date    WBC 6.14 2023    HGB 12.3 2023    HCT 37.7 2023    MCV 85.5 2023     2023       Infant: female       Assessment   1.  POD 1, doing well; am labs pending   2.  GDMA1--  dc'd fingersticks    Plan  Routine postoperative care.  Advance.      Active Problems:   None      Sharmila Washington, APRN  9/15/2023 07:45 EDT

## 2023-09-15 NOTE — ANESTHESIA POSTPROCEDURE EVALUATION
Patient: Emma Fernandez    Procedure Summary       Date: 23 Room / Location: Replaced by [facility] LABOR DELIVERY   [facility] DELIV[facility]    Anesthesia Start: 735 Anesthesia Stop: 850    Procedure:  SECTION PRIMARY (Abdomen) Diagnosis:     Surgeons: Florence Colorado MD Provider: Valentin Angulo MD    Anesthesia Type: ITN, spinal ASA Status: 2            Anesthesia Type: ITN, spinal    Vitals  Vitals Value Taken Time   /71 09/15/23 0808   Temp 97.6 °F (36.4 °C) 09/15/23 0808   Pulse 83 09/15/23 0808   Resp 16 09/15/23 0808   SpO2 100 % 23 0957   Vitals shown include unvalidated device data.        Post Anesthesia Care and Evaluation    Patient location during evaluation: bedside  Patient participation: complete - patient participated  Level of consciousness: awake and alert  Pain management: adequate    Airway patency: patent  Anesthetic complications: No anesthetic complications    Cardiovascular status: acceptable  Respiratory status: acceptable  Hydration status: acceptable  Post Neuraxial Block status: Motor and sensory function returned to baseline and No signs or symptoms of PDPH

## 2023-09-16 PROCEDURE — 99231 SBSQ HOSP IP/OBS SF/LOW 25: CPT

## 2023-09-16 RX ADMIN — IBUPROFEN 600 MG: 600 TABLET ORAL at 22:21

## 2023-09-16 RX ADMIN — DOCUSATE SODIUM 100 MG: 100 CAPSULE, LIQUID FILLED ORAL at 10:11

## 2023-09-16 RX ADMIN — ACETAMINOPHEN 650 MG: 325 TABLET ORAL at 06:29

## 2023-09-16 RX ADMIN — ACETAMINOPHEN 650 MG: 325 TABLET ORAL at 13:13

## 2023-09-16 RX ADMIN — IBUPROFEN 600 MG: 600 TABLET ORAL at 15:55

## 2023-09-16 RX ADMIN — SIMETHICONE 80 MG: 80 TABLET, CHEWABLE ORAL at 10:11

## 2023-09-16 RX ADMIN — IBUPROFEN 600 MG: 600 TABLET ORAL at 10:11

## 2023-09-16 RX ADMIN — ACETAMINOPHEN 650 MG: 325 TABLET ORAL at 00:42

## 2023-09-16 RX ADMIN — IBUPROFEN 600 MG: 600 TABLET ORAL at 04:22

## 2023-09-16 RX ADMIN — SIMETHICONE 80 MG: 80 TABLET, CHEWABLE ORAL at 15:56

## 2023-09-16 RX ADMIN — ACETAMINOPHEN 650 MG: 325 TABLET ORAL at 18:39

## 2023-09-16 NOTE — PROGRESS NOTES
2023    Name:Emma Fernandez    MR#:[id number]     PROGRESS NOTE:  Post-Op Day 2 S/P    HD:2    Subjective   28 y.o. yo Female  s/p CS at 38w0d doing well. Pain well controlled. Tolerating regular diet and having flatus. Lochia normal.       Pregnancy    Delivery of pregnancy by  section        Objective    Vitals  Temp:  Temp:  [97.7 °F (36.5 °C)-98 °F (36.7 °C)] 97.9 °F (36.6 °C)  Temp src: Oral  BP:  BP: (106-118)/(66-76) 117/74  Pulse:  Heart Rate:  [75-91] 86  RR:   Resp:  [16-18] 16    General Awake, alert, no distress  Abdomen Soft, non-distended, fundus firm, below umbilicus, appropriately tender  Incision  Intact, no erythema or exudate  Extremities Calves NT bilaterally     I/O last 3 completed shifts:  In: 480 [P.O.:480]  Out: 1950 [Urine:1950]    LABS:   Lab Results   Component Value Date    WBC 6.90 09/15/2023    HGB 10.8 (L) 09/15/2023    HCT 33.3 (L) 09/15/2023    MCV 85.6 09/15/2023     09/15/2023       Infant: female       Assessment   1.  POD 2, PCS- Unfavorable cervix, Suspect LGA. Doing well.    2.  GDM diet controlled     3.  Anemic, Asymptomatic, VSS.         Plan:    1.  Doing well.  Routine postoperative care. Advance.       Active Problems:   None      Nya Vasquez, APRN  2023 13:03 EDT

## 2023-09-17 VITALS
WEIGHT: 144 LBS | HEIGHT: 61 IN | SYSTOLIC BLOOD PRESSURE: 117 MMHG | OXYGEN SATURATION: 100 % | RESPIRATION RATE: 16 BRPM | DIASTOLIC BLOOD PRESSURE: 66 MMHG | BODY MASS INDEX: 27.19 KG/M2 | TEMPERATURE: 98.4 F | HEART RATE: 83 BPM

## 2023-09-17 PROCEDURE — 99238 HOSP IP/OBS DSCHRG MGMT 30/<: CPT

## 2023-09-17 RX ORDER — IBUPROFEN 600 MG/1
600 TABLET ORAL EVERY 6 HOURS
Qty: 30 TABLET | Refills: 0 | Status: SHIPPED | OUTPATIENT
Start: 2023-09-17

## 2023-09-17 RX ORDER — OXYCODONE HYDROCHLORIDE 5 MG/1
5 TABLET ORAL EVERY 6 HOURS PRN
Qty: 12 TABLET | Refills: 0 | Status: SHIPPED | OUTPATIENT
Start: 2023-09-17

## 2023-09-17 RX ADMIN — ACETAMINOPHEN 650 MG: 325 TABLET ORAL at 00:45

## 2023-09-17 RX ADMIN — SIMETHICONE 80 MG: 80 TABLET, CHEWABLE ORAL at 10:27

## 2023-09-17 RX ADMIN — IBUPROFEN 600 MG: 600 TABLET ORAL at 10:27

## 2023-09-17 RX ADMIN — DOCUSATE SODIUM 100 MG: 100 CAPSULE, LIQUID FILLED ORAL at 10:27

## 2023-09-17 RX ADMIN — OXYCODONE HYDROCHLORIDE 5 MG: 5 TABLET ORAL at 04:23

## 2023-09-17 RX ADMIN — IBUPROFEN 600 MG: 600 TABLET ORAL at 04:18

## 2023-09-17 RX ADMIN — OXYCODONE HYDROCHLORIDE 5 MG: 5 TABLET ORAL at 10:26

## 2023-09-17 RX ADMIN — ACETAMINOPHEN 650 MG: 325 TABLET ORAL at 06:20

## 2023-09-17 NOTE — DISCHARGE SUMMARY
Discharge Summary    Date of Admission: 2023  Date of Discharge:  2023      Patient: Emma Fernandez      MR#:[id number]    Primary Surgeon/OB: Florence Colorado MD    Discharge Surgeon/OB: Stanislav    Presenting Problem/History of Present Illness  Pregnancy [Z34.90]       Pregnancy    Delivery of pregnancy by  section         Discharge Diagnosis:  section at 38w0d    Procedures:  , Low Transverse     2023    8:03 AM          Discharge Date: 2023; Discharge Time: 11:21 EDT    Early Discharge:  NO    Hospital Course  Patient is a 28 y.o. female  at 38w0d status post  section with uneventful postoperative recovery.  Patient was advanced to regular diet on postoperative day#1. Patient reported lochia as mild and pain managed. On discharge, ambulating, tolerating a regular diet without any difficulties and her incision is dry, clean and intact.     Infant:   female  fetus 3890 g (8 lb 9.2 oz)  with Apgar scores of 8 , 9  at five minutes.    Condition on Discharge:  Stable    Vital Signs  Temp:  [97.9 °F (36.6 °C)-98.4 °F (36.9 °C)] 98.4 °F (36.9 °C)  Heart Rate:  [73-86] 83  Resp:  [16] 16  BP: (114-130)/(66-77) 117/66    Lab Results   Component Value Date    WBC 6.90 09/15/2023    HGB 10.8 (L) 09/15/2023    HCT 33.3 (L) 09/15/2023    MCV 85.6 09/15/2023     09/15/2023       Discharge Disposition  Home or Self Care    Discharge Medications     Discharge Medications        New Medications        Instructions Start Date   ibuprofen 600 MG tablet  Commonly known as: ADVIL,MOTRIN   600 mg, Oral, Every 6 Hours      oxyCODONE 5 MG immediate release tablet  Commonly known as: ROXICODONE   5 mg, Oral, Every 6 Hours PRN             Continue These Medications        Instructions Start Date   Blood Glucose Monitoring Suppl w/Device kit   1 each, Does not apply, Daily, Please dispense any meter, strips and lancets that are formulary      Lancets misc   Test  blood sugars QID             Stop These Medications      acetaminophen 500 MG tablet  Commonly known as: TYLENOL     Blood Glucose Test strip     docusate sodium 50 MG capsule  Commonly known as: COLACE     Ketone Test strip     PRENATAL PO     Slow Fe 142 (45 Fe) MG tablet controlled-release  Generic drug: Ferrous Sulfate ER              Activity at Discharge:   Activity Instructions       Driving Restrictions      Type of Restriction: Driving    Driving Restrictions: No Driving (Time Limited)    Length: 2 Weeks    Lifting Restrictions      Type of Restriction: Lifting    Lifting Restrictions: Lifting Restriction (Indicate Limit)    Weight Limit (Pounds): 10    Length of Lifting Restriction: 6 weeks    Pelvic Rest      No Sex, Tampons, Swimming, Tub Baths x 6 weeks            Follow-up Appointments  Future Appointments   Date Time Provider Department Center   9/29/2023 10:30 AM Sharmila Washington APRN [facility]  [facility]   11/16/2023 10:30 AM Inessa Garza MD [facility]     Additional Instructions for the Follow-ups that You Need to Schedule       Call MD With Problems / Concerns   As directed      Instructions: Instructions: Call for symptoms related to depression, foul smelling discharge, fever >/=100.4F, blood clots bigger than a golf ball, saturating through >/= 1 pad per hour.    Order Comments: Instructions: Instructions: Call for symptoms related to depression, foul smelling discharge, fever >/=100.4F, blood clots bigger than a golf ball, saturating through >/= 1 pad per hour.         Discharge Follow-up with Specified Provider: Stanislav; 2 Weeks   As directed      To: Stanislav   Follow Up: 2 Weeks   Follow Up Details: Incision check                MONAE Winters  09/17/23  11:21 EDT  Csd

## 2023-09-17 NOTE — LACTATION NOTE
09/17/23 1015   Maternal Information   Date of Referral 09/17/23   Person Making Referral lactation consultant  (revisit)   Maternal Reason for Referral no prior breastfeeding experience   Infant Reason for Referral weight gain inadequate  (weight loss has improved from 11% to 10 %)   Maternal Assessment   Breast Size Issue none   Breast Pumping   Breast Pumping Interventions post-feed pumping encouraged  (encouraged to pump after every feeding)   Lactation Referrals   Lactation Referrals outpatient lactation program     Discussed the outpatient lactation clinic after discharge.  Encouraged patient that she is doing a good job.  Patient stated she is breastfeeding, pumping, and paced bottle feeding. Encouraged PRN lactation as needed.

## 2023-09-29 ENCOUNTER — POSTPARTUM VISIT (OUTPATIENT)
Dept: OBSTETRICS AND GYNECOLOGY | Facility: CLINIC | Age: 28
End: 2023-09-29
Payer: MEDICAID

## 2023-09-29 VITALS — SYSTOLIC BLOOD PRESSURE: 108 MMHG | WEIGHT: 122 LBS | BODY MASS INDEX: 23.05 KG/M2 | DIASTOLIC BLOOD PRESSURE: 76 MMHG

## 2023-09-29 DIAGNOSIS — F41.9 ANXIETY: ICD-10-CM

## 2023-09-29 PROBLEM — Z34.90 PREGNANCY: Status: RESOLVED | Noted: 2023-09-05 | Resolved: 2023-09-29

## 2023-09-29 PROBLEM — Z86.32 HISTORY OF GESTATIONAL DIABETES: Status: ACTIVE | Noted: 2023-07-11

## 2023-09-29 PROBLEM — Z34.90 PRENATAL CARE, ANTEPARTUM: Status: RESOLVED | Noted: 2023-03-01 | Resolved: 2023-09-29

## 2023-09-29 PROBLEM — O44.20 MARGINAL PLACENTA PREVIA: Status: RESOLVED | Noted: 2023-05-08 | Resolved: 2023-09-29

## 2023-09-29 PROBLEM — O36.63X0 EXCESSIVE FETAL GROWTH AFFECTING MANAGEMENT OF PREGNANCY IN THIRD TRIMESTER: Status: RESOLVED | Noted: 2023-09-05 | Resolved: 2023-09-29

## 2023-09-29 PROBLEM — O99.019 MATERNAL ANEMIA IN PREGNANCY, ANTEPARTUM: Status: RESOLVED | Noted: 2023-07-11 | Resolved: 2023-09-29

## 2023-09-29 PROBLEM — O09.819 HIGH RISK PREGNANCY DUE TO ASSISTED REPRODUCTIVE TECHNOLOGY: Status: RESOLVED | Noted: 2023-03-01 | Resolved: 2023-09-29

## 2023-09-29 NOTE — PROGRESS NOTES
Chief Complaint   Patient presents with    Postpartum Care       Postpartum Visit         Emma Fernandez is a 28 y.o.  who presents today for a 2 week(s) postpartum check.        , Low Transverse    Information for the patient's :  Rose Fernandez [[id number]]   2023   female   Rose Fernandez   3890 g (8 lb 9.2 oz)   Gestational Age: 38w0d    Baby Discharged with Mom  Delivering MD: Florence Colorado MD.    Pregnancy complications: gestational DM (diet controlled)    Patient reports her incision is clean, dry, intact. Patient describes vaginal bleeding as light.  She is bottle feeding. Patient denies bowel or bladder issues.    She desires contraceptive methods: OCP or NuvaRing  for contraception.    Patient reports postpartum anxiety with some depression. Postpartum Depression Screening Questionnaire: 18, treatment indicated. She has baseline anxiety which she previously was treated for with Zoloft 50mg. She would like to possibly restart this.       The additional following portions of the patient's history were reviewed and updated as appropriate: allergies, current medications, past family history, past medical history, past social history, past surgical history, and problem list.      Review of Systems   Constitutional: Negative.    HENT: Negative.     Eyes: Negative.    Respiratory: Negative.     Cardiovascular: Negative.    Gastrointestinal: Negative.    Endocrine: Negative.    Genitourinary: Negative.    Musculoskeletal: Negative.    Skin: Negative.    Allergic/Immunologic: Negative.    Neurological: Negative.    Hematological: Negative.    Psychiatric/Behavioral:  Positive for depressed mood. The patient is nervous/anxious.      I have reviewed and agree with the HPI, ROS, and historical information as entered above. Sharmila Washington, APRN      Objective   /76   Wt 55.3 kg (122 lb)   LMP 10/24/2022 (Exact Date)   Breastfeeding No   BMI 23.05 kg/m²      Physical Exam  Vitals and nursing note reviewed.   Constitutional:       General: She is not in acute distress.     Appearance: Normal appearance. She is normal weight. She is not ill-appearing.   Pulmonary:      Effort: Pulmonary effort is normal. No respiratory distress.   Abdominal:      General: There is no distension.      Palpations: Abdomen is soft. There is no mass.      Tenderness: There is no guarding or rebound.      Hernia: No hernia is present.      Comments: Incision well approximated with no redness or drainage   Skin:     General: Skin is warm and dry.   Neurological:      Mental Status: She is alert and oriented to person, place, and time.   Psychiatric:         Mood and Affect: Mood normal.         Behavior: Behavior normal.            Assessment and Plan    Problem List Items Addressed This Visit          Gravid and     Delivery of pregnancy by  section - Primary    Overview     2023-primary  section for LGA and gestational diabetes.  Baby weighed 8 pounds 7 ounces named Rose.            Mental Health    Anxiety    Relevant Medications    sertraline (Zoloft) 50 MG tablet     Other Visit Diagnoses       Postpartum follow-up                S/p , 2 week(s) postpartum.  Doing well.    Continued pelvic rest with a return to driving and light physical activity.  Baby doing well.  Erx Zoloft.  Call prn worsening symptoms.  Return in about 4 weeks (around 10/27/2023) for pp.    Sharmila Washington, APRN  2023

## 2023-10-27 ENCOUNTER — POSTPARTUM VISIT (OUTPATIENT)
Dept: OBSTETRICS AND GYNECOLOGY | Facility: CLINIC | Age: 28
End: 2023-10-27
Payer: MEDICAID

## 2023-10-27 VITALS
SYSTOLIC BLOOD PRESSURE: 108 MMHG | DIASTOLIC BLOOD PRESSURE: 80 MMHG | HEIGHT: 61 IN | BODY MASS INDEX: 23.53 KG/M2 | WEIGHT: 124.6 LBS

## 2023-10-27 NOTE — PROGRESS NOTES
Chief Complaint   Patient presents with    Postpartum Care     6 wk       Postpartum Visit         Emma Fernandez is a 28 y.o.  who presents today for a 6 week(s) postpartum check.     C/S:  LGA and GDM      , Low Transverse    Information for the patient's :  Rose Fernandez [[id number]]   2023   female   Rose Fernandez   3890 g (8 lb 9.2 oz)   Gestational Age: 38w0d        Baby Discharged: Discharged with Mom  Delivering Physician: Florence Colorado MD    At the time of delivery were you diagnosed with any of the following: Gestational diabetes. The incision is healing well. Patient describes vaginal bleeding as light.  Patient is bottle feeding.  She desires contraceptive methods: wants to discuss Mirena and possible other options  for contraception.  Patient denies bowel or bladder issues.    Patient reports she still has glue on her incision. She was unsure about removing it.     Patient reports postpartum depression. Postpartum Depression Screening Questionnaire: 7, Patient is taking Zoloft 50 mg QD treatment indicated. She stated she feels better than she did, but doesn't feel herself. She states she doesn't cry anymore but still doesn't feel her normal.      Last Pap : 11/3/22. Results: negative. HPV: negative.   Last Completed Pap Smear            PAP SMEAR (Every 3 Years) Next due on 11/3/2025      2022  LIQUID-BASED PAP SMEAR, [facility] ([facility])                      The additional following portions of the patient's history were reviewed and updated as appropriate: allergies, current medications, past family history, past medical history, past social history, past surgical history, and problem list.    Review of Systems   Constitutional: Negative.    HENT: Negative.     Eyes: Negative.    Respiratory: Negative.     Cardiovascular: Negative.    Gastrointestinal: Negative.    Endocrine: Negative.    Genitourinary: Negative.    Musculoskeletal:  "Negative.    Skin: Negative.    Allergic/Immunologic: Negative.    Neurological: Negative.    Hematological: Negative.    Psychiatric/Behavioral: Negative.       All other systems reviewed and are negative.     I have reviewed and agree with the HPI, ROS, and historical information as entered above. Florence Colorado MD      /80   Ht 154.9 cm (61\")   Wt 56.5 kg (124 lb 9.6 oz)   LMP 10/24/2022 (Exact Date)   Breastfeeding No   BMI 23.54 kg/m²     Physical Exam  Vitals and nursing note reviewed. Exam conducted with a chaperone present.   Constitutional:       Appearance: She is well-developed.   HENT:      Head: Normocephalic and atraumatic.   Pulmonary:      Effort: Pulmonary effort is normal.   Abdominal:      General: A surgical scar is present.      Palpations: Abdomen is soft. Abdomen is not rigid.      Comments: Clean, Dry, and Intact.  No erythema.    Genitourinary:     Vagina: No lesions or prolapsed vaginal walls.      Cervix: No lesion or erythema.      Uterus: Enlarged. Not tender and no uterine prolapse.       Adnexa:         Right: No mass, tenderness or fullness.          Left: No mass, tenderness or fullness.     Musculoskeletal:      Cervical back: Normal range of motion.   Neurological:      Mental Status: She is alert and oriented to person, place, and time.   Psychiatric:         Mood and Affect: Mood normal.         Behavior: Behavior normal.             Assessment and Plan    Problem List Items Addressed This Visit          Gravid and     Delivery of pregnancy by  section - Primary    Overview     2023-primary  section for LGA and gestational diabetes.  Baby weighed 8 pounds 7 ounces named Rose.            S/p , 6 week(s) postpartum.  Doing well.    Return to normal physical activity.  No pelvic restrictions.   Baby doing well.  Bottlefeeding going well.  No si/sx of postpartum depression  Contraception: contraceptive methods: IUD.  Insertion " date: Pre-CERT in place in 4 weeks  Return in about 4 weeks (around 11/24/2023) for Precert Mirena and place next visit.   Total time spent today with Emma  was 20-29 minutes (level 3).  Off this time, > 50% was spent face-to-face time coordinating care, answering her questions and counseling regarding pathophysiology of her presenting problem along with plans for any diagnositc work-up and treatment.    Florence Colorado MD  10/27/2023

## 2023-11-22 ENCOUNTER — OFFICE VISIT (OUTPATIENT)
Dept: OBSTETRICS AND GYNECOLOGY | Facility: CLINIC | Age: 28
End: 2023-11-22
Payer: MEDICAID

## 2023-11-22 VITALS
WEIGHT: 127 LBS | HEIGHT: 61 IN | SYSTOLIC BLOOD PRESSURE: 110 MMHG | DIASTOLIC BLOOD PRESSURE: 72 MMHG | BODY MASS INDEX: 23.98 KG/M2

## 2023-11-22 DIAGNOSIS — Z32.02 PREGNANCY EXAMINATION OR TEST, NEGATIVE RESULT: ICD-10-CM

## 2023-11-22 DIAGNOSIS — Z30.430 ENCOUNTER FOR IUD INSERTION: Primary | ICD-10-CM

## 2023-11-22 LAB
B-HCG UR QL: NEGATIVE
EXPIRATION DATE: NORMAL
INTERNAL NEGATIVE CONTROL: NORMAL
INTERNAL POSITIVE CONTROL: NORMAL
Lab: NORMAL

## 2023-11-22 RX ORDER — CEPHALEXIN 500 MG/1
CAPSULE ORAL
COMMUNITY
Start: 2023-11-20

## 2023-11-22 NOTE — PROGRESS NOTES
"     Gynecologic Procedure Note        Procedure: IUD Insertion     Procedures    Pre procedure indication 1) Desires Mirena  Post procedure indication 1) Desires Mirena    NDC: Mirena 63483-748-49  Lot #: [id number]  Exp Date: 12/1/2025  BH device    /72   Ht 154.9 cm (61\")   Wt 57.6 kg (127 lb)   LMP  (LMP Unknown)   Breastfeeding No   BMI 24.00 kg/m²       The risks, benefits, and alternatives to Mirena were explained at length with the patient. All her questions were answered and consents were signed.  Her LMP was unknown .  Urine Pregnancy Test was Negative.  Patient does not have an allergy to betadine or shellfish. Pt denies unprotected intercourse in the past 2 weeks.     Time out: immediate members of the procedure team and patient agree to the following: correct patient, correct site, correct procedure to be performed. MONAE Plummer      The patient was placed in a dorsal lithotomy position on the examining table in stirrups.  A speculum was inserted into the vagina and the cervix was brought into view.  The cervix was prepped with Betadine. The anterior lip of the cervix was then grasped with a single-tooth tenaculum. The endometrial cavity was then sounded to 7 centimeters. The sealed Mirena package was opened and the IUD was removed in a sterile fashion.    The upper edge of the depth setting the flange was set at the uterine sound measurement. The  was then carefully advanced to the cervical canal into the uterus to the level of the fundus.  The slider was then retracted about 1 cm and deployed the device. The device was then gently advanced to the fundus. The IUD was then released by pulling the slider down all the way. The  was removed carefully from the uterus. The threads were then cut leaving 2-3 cm visible outside of the cervix.  The single-tooth tenaculum was removed from the anterior lip. Good hemostasis was noted.   All other instruments were removed from the " vagina.       The patient tolerated the procedure well with a moderate amount of discomfort.  She was monitored for 10 minutes prior to discharge.      There were no complications.    The patient was counseled about the need to return in 4 weeks for IUD check.     She was counseled about the need to use a backup method of contraception such as condoms until her post insertion exam was performed. The patient verbalized understanding when the IUD will need to be removed/replaced. Written information was given to the patient.  The patient is counseled to contact us if she has any significant or increasing bleeding, pain, fever, chills, or other concerns. She is instructed to see a doctor right away if she believes that she may be pregnant at any time with the IUD in place.    Petrona Valdez, MONAE  11/22/2023

## 2023-12-20 ENCOUNTER — OFFICE VISIT (OUTPATIENT)
Dept: OBSTETRICS AND GYNECOLOGY | Facility: CLINIC | Age: 28
End: 2023-12-20
Payer: MEDICAID

## 2023-12-20 VITALS
WEIGHT: 133.2 LBS | HEIGHT: 61 IN | SYSTOLIC BLOOD PRESSURE: 120 MMHG | DIASTOLIC BLOOD PRESSURE: 78 MMHG | BODY MASS INDEX: 25.15 KG/M2

## 2023-12-20 DIAGNOSIS — Z30.431 IUD CHECK UP: Primary | ICD-10-CM

## 2023-12-20 PROBLEM — Z97.5 IUD (INTRAUTERINE DEVICE) IN PLACE: Status: ACTIVE | Noted: 2023-12-20

## 2023-12-20 RX ORDER — LEVONORGESTREL 52 MG/1
1 INTRAUTERINE DEVICE INTRAUTERINE
COMMUNITY

## 2023-12-20 NOTE — PROGRESS NOTES
"    Chief Complaint   Patient presents with    Follow-up     USG/IUD check         Subjective   HPI  Emma Fernandez is a 28 y.o. female, , who presents for IUD check follow up.  She had a Mirena placed on 2023. Since the IUD placement, the patient reports intermittent bleeding since . She states that she is having light bleeding almost every day since IUD placement. Patient denies any cramping or pain. She has had a period since the IUD was placed.    The additional following portions of the patient's history were reviewed and updated as appropriate: allergies, current medications, past family history, past medical history, past social history, past surgical history, and problem list.    Did the patient have u/s today? Yes. Findings showed IUD had correct placement.  I have personally evaluated the U/S and agree with the findings. Nya Vasquez, MONAE    Review of Systems   Respiratory: Negative.  Negative for shortness of breath.    Cardiovascular: Negative.  Negative for chest pain.   Gastrointestinal: Negative.  Negative for abdominal distention, abdominal pain and constipation.   Genitourinary:  Positive for vaginal bleeding (mild). Negative for dyspareunia, dysuria, pelvic pain, pelvic pressure, vaginal discharge and vaginal pain.     All other systems reviewed and are negative.     I have reviewed and agree with the HPI, ROS, and historical information as entered above. Nya Vasquez, APRN      Objective   /78   Ht 154.9 cm (61\")   Wt 60.4 kg (133 lb 3.2 oz)   LMP  (LMP Unknown)   BMI 25.17 kg/m²     Physical Exam  Vitals and nursing note reviewed.   Constitutional:       General: She is not in acute distress.     Appearance: Normal appearance. She is not ill-appearing or toxic-appearing.   HENT:      Head: Normocephalic and atraumatic.   Pulmonary:      Effort: Pulmonary effort is normal.   Neurological:      Mental Status: She is alert and oriented to person, place, and time. "   Psychiatric:         Mood and Affect: Mood normal.         Behavior: Behavior normal.         Assessment & Plan     Assessment     Problem List Items Addressed This Visit       IUD (intrauterine device) in place - Primary    Overview     Mirena inserted 11/22/23  Placement confirmed w/ US 12/20/23            IUD placed correctly.   Return in about 1 year (around 12/20/2024) for Annual physical or sooner if needed.      Nya Vasquez, APRN  12/20/2023

## 2024-09-17 DIAGNOSIS — F41.9 ANXIETY: ICD-10-CM

## 2024-10-21 DIAGNOSIS — F41.9 ANXIETY: ICD-10-CM

## 2024-10-21 NOTE — TELEPHONE ENCOUNTER
Pati,   Please call patient.  We can call in her Zoloft for 1 month further but we need to her have her come in for an annual exam and discussion of her depression.

## 2024-10-23 NOTE — TELEPHONE ENCOUNTER
Sent My Chart message stating that she needs to schedule annual and we will be happy to refill Zoloft once she is scheduled.

## 2024-10-24 ENCOUNTER — TELEPHONE (OUTPATIENT)
Dept: OBSTETRICS AND GYNECOLOGY | Facility: CLINIC | Age: 29
End: 2024-10-24
Payer: MEDICAID

## 2024-10-24 NOTE — TELEPHONE ENCOUNTER
Caller: Emma Fernandez    Relationship: Self    Best call back number: [phone]    Requested Prescriptions:   Requested Prescriptions      No prescriptions requested or ordered in this encounter      sertraline (ZOLOFT) 50 MG tablet    Pharmacy where request should be sent: [facility] [place], [place] - [address] - [phone]  - [phone] FX     Last office visit with prescribing clinician: 11/21/2022   Last telemedicine visit with prescribing clinician: Visit date not found   Next office visit with prescribing clinician: Visit date not found     Additional details provided by patient: PATIENT CALLED TO SCHEDULE ANNUAL. frenting MESSAGE FOR REFILL REQUEST ON 10/24/24.    APPT NOTE FROM 12/20/23:Return in about 1 year (around 12/20/2024) for Annual physical or sooner if needed. HUB SCHEDULED ANNUAL FOR 12/30/24. REQUESTING REFILLS TO COVER UNTIL APPT.    Does the patient have less than a 3 day supply:  [] Yes  [x] No    Would you like a call back once the refill request has been completed: [] Yes [x] No    If the office needs to give you a call back, can they leave a voicemail: [x] Yes [] No    Morales Barnhart Rep   10/24/24 09:28 EDT

## 2024-10-24 NOTE — TELEPHONE ENCOUNTER
Patient of Dr. Colorado; LOV 12/20/23 for IUD check up.   Returned patient's call.   She was given a one month refill of Zoloft on 10/21/24 and told would need to be seen for further refills.   States she called to schedule annual visit and was scheduled in December.  Rescheduled annual visit to 11/11/24; patient v/u and agreed.

## 2024-11-11 ENCOUNTER — OFFICE VISIT (OUTPATIENT)
Dept: OBSTETRICS AND GYNECOLOGY | Facility: CLINIC | Age: 29
End: 2024-11-11
Payer: MEDICAID

## 2024-11-11 VITALS — DIASTOLIC BLOOD PRESSURE: 80 MMHG | SYSTOLIC BLOOD PRESSURE: 116 MMHG | WEIGHT: 138 LBS | BODY MASS INDEX: 26.07 KG/M2

## 2024-11-11 DIAGNOSIS — N80.9 ENDOMETRIOSIS: ICD-10-CM

## 2024-11-11 DIAGNOSIS — F32.A ANXIETY AND DEPRESSION: ICD-10-CM

## 2024-11-11 DIAGNOSIS — F41.9 ANXIETY AND DEPRESSION: ICD-10-CM

## 2024-11-11 DIAGNOSIS — Z97.5 IUD (INTRAUTERINE DEVICE) IN PLACE: ICD-10-CM

## 2024-11-11 DIAGNOSIS — N64.52 BREAST DISCHARGE: ICD-10-CM

## 2024-11-11 DIAGNOSIS — R63.8 UNABLE TO LOSE WEIGHT: ICD-10-CM

## 2024-11-11 DIAGNOSIS — Z01.419 WOMEN'S ANNUAL ROUTINE GYNECOLOGICAL EXAMINATION: Primary | ICD-10-CM

## 2024-11-11 DIAGNOSIS — Z12.4 SCREENING FOR CERVICAL CANCER: ICD-10-CM

## 2024-11-11 RX ORDER — SERTRALINE HYDROCHLORIDE 100 MG/1
100 TABLET, FILM COATED ORAL DAILY
Qty: 90 TABLET | Refills: 3 | Status: SHIPPED | OUTPATIENT
Start: 2024-11-11

## 2024-11-11 NOTE — PROGRESS NOTES
Gynecologic Annual Exam Note        Gynecologic Exam        Subjective     HPI  Emma Fernandez is a 29 y.o.  female who presents for annual well woman exam as a established patient. There were no changes to her medical or surgical history since her last visit.. No LMP recorded. Patient has had an implant. Her periods are absent secondary to birth control.  Marital Status: .  She is sexually active. She has not had new partners.. STD testing recommendations have been explained to the patient and she does not desire STD testing.    The patient would like to discuss the following complaints today: mood changes even on Zoloft- she reports may need to increase. Increase intermittent bloating and weight gain since IUD placement. Healthy eating and walking nightly. Admits can eat a little better. Denies exercised to increase heart rate. Bilateral breast discharge (scant, white) since delivery of baby last year. L>R. Tried to breast feed 2023, but milk did not come in. Discharge is only noted with nipple stimulation or pressure to breasts.      Additional OB/GYN History   contraceptive methods: Mirena IUD  Desires to: continue contraception  Thromboembolic Disease: none  History of migraines: no  History of STD: no    Last Pap : 11/3/22. Results: negative. HPV: negative.   Last Completed Pap Smear            Ordered - PAP SMEAR (Every 3 Years) Ordered on 2024  LIQUID-BASED PAP SMEAR, [facility] ([facility])                     History of abnormal Pap smear: no  Gardasil status:completed  Family history of uterine, colon, breast, or ovarian cancer: yes - father with colon  Performs monthly Self-Breast Exam: no  Exercises Regularly:yes  Feelings of Anxiety or Depression: yes - on Zoloft  Tobacco Usage?: No       Current Outpatient Medications:     Levonorgestrel (Mirena, 52 MG,) 20 MCG/DAY intrauterine device IUD, To be inserted one time by prescriber. Route intrauterine., Disp: ,  Rfl:     sertraline (Zoloft) 100 MG tablet, Take 1 tablet by mouth Daily., Disp: 90 tablet, Rfl: 3     Patient is requesting refills of Zoloft.    OB History          1    Para   1    Term   1       0    AB   0    Living   1         SAB   0    IAB   0    Ectopic   0    Molar   0    Multiple   0    Live Births   1          Obstetric Comments   Fob #1 - Pregnancy #1    IVF - Frozen - 2022  Transfer 2023                   Health Maintenance   Topic Date Due    MOST FORM  Never done    ANNUAL PHYSICAL  Never done    Annual Gynecologic Pelvic and Breast Exam  2023    INFLUENZA VACCINE  Never done    COVID-19 Vaccine (2 - - season) 2024    PAP SMEAR  2025    BMI FOLLOWUP  2025    TDAP/TD VACCINES (2 - Td or Tdap) 07/10/2033    HEPATITIS C SCREENING  Completed    Pneumococcal Vaccine 0-64  Aged Out       Past Medical History:   Diagnosis Date    Endometriosis 2022    Fibroid     Gestational diabetes     History of migraine     History of uterine fibroid     Had endometriosis surgery a year ago, removed fibroids    [place] product of in vitro fertilization (IVF) pregnancy 2023    Ovarian cyst     PMS (premenstrual syndrome)         Past Surgical History:   Procedure Laterality Date    APPENDECTOMY  2022     SECTION N/A 2023    Procedure:  SECTION PRIMARY;  Surgeon: Florence Colorado MD;  Location: [facility] LABOR DELIVERY;  Service: Obstetrics/Gynecology;  Laterality: N/A;    DILATATION AND CURETTAGE  2022    endometriosis removal in [place]    LAPAROSCOPIC APPENDECTOMY      OTHER SURGICAL HISTORY Left     REROUTED TUBE FROM KIDNEY TO THE BLADDER D/T KIDNEY REFLUX    OVARIAN CYST SURGERY  2022    PELVIC LAPAROSCOPY  2022    WISDOM TOOTH EXTRACTION         The additional following portions of the patient's history were reviewed and updated as appropriate: allergies, current medications,  past family history, past medical history, past social history, and past surgical history.    Review of Systems   Constitutional:         Unable to lose weight   Respiratory: Negative.  Negative for shortness of breath.    Cardiovascular: Negative.  Negative for chest pain.   Gastrointestinal:  Negative for abdominal distention, abdominal pain and constipation.        Bloating.    Genitourinary: Negative.  Positive for breast discharge. Negative for breast lump, breast pain, dyspareunia, dysuria, menstrual problem, pelvic pain, pelvic pressure, urinary incontinence, vaginal bleeding, vaginal discharge and vaginal pain.   Psychiatric/Behavioral:  Positive for depressed mood. Negative for suicidal ideas. The patient is nervous/anxious.         Taking Zoloft.          I have reviewed and agree with the HPI, ROS, and historical information as entered above. Nya Vasquez, APRN          Objective   /80   Wt 62.6 kg (138 lb)   BMI 26.07 kg/m²     Physical Exam  Vitals and nursing note reviewed. Exam conducted with a chaperone present.   Constitutional:       General: She is not in acute distress.     Appearance: Normal appearance. She is well-developed. She is not ill-appearing or toxic-appearing.   HENT:      Head: Normocephalic and atraumatic.   Pulmonary:      Effort: Pulmonary effort is normal. No retractions.   Chest:      Chest wall: No mass.   Breasts:     Breasts are symmetrical.      Right: Normal. Nipple discharge present. No swelling, bleeding, inverted nipple, mass, skin change or tenderness.      Left: Normal. Nipple discharge present. No swelling, bleeding, inverted nipple, mass, skin change or tenderness.      Comments: White, with nipple stimulation only.   Abdominal:      Palpations: Abdomen is soft. Abdomen is not rigid. There is no mass.      Tenderness: There is no abdominal tenderness. There is no guarding or rebound.      Hernia: No hernia is present. There is no hernia in the left  inguinal area or right inguinal area.   Genitourinary:     General: Normal vulva.      Labia:         Right: No rash, tenderness or lesion.         Left: No rash, tenderness or lesion.       Vagina: Normal. No vaginal discharge, erythema, tenderness, bleeding or lesions.      Cervix: No cervical motion tenderness, discharge, friability, lesion, erythema or cervical bleeding.      Uterus: Normal. Not enlarged, not fixed and not tender.       Adnexa: Right adnexa normal and left adnexa normal.        Right: No mass or tenderness.          Left: No mass or tenderness.        Rectum: No external hemorrhoid.         Lymphadenopathy:      Upper Body:      Right upper body: No supraclavicular or axillary adenopathy.      Left upper body: No supraclavicular or axillary adenopathy.   Neurological:      Mental Status: She is alert and oriented to person, place, and time.   Psychiatric:         Mood and Affect: Mood normal.         Behavior: Behavior normal.            Assessment and Plan    Problem List Items Addressed This Visit       Anxiety    Relevant Medications    sertraline (Zoloft) 100 MG tablet    Endometriosis    Overview     February 2022-laparoscopy by RAMONA Valdivia in [place], demonstrated stage III endometriosis         IUD (intrauterine device) in place    Overview     Mirena inserted 11/22/23  Placement confirmed w/ US 12/20/23          Other Visit Diagnoses       Women's annual routine gynecological examination    -  Primary    Relevant Medications    sertraline (Zoloft) 100 MG tablet    Other Relevant Orders    CBC & Differential    Comprehensive Metabolic Panel    TSH Rfx On Abnormal To Free T4    Hemoglobin A1c    LIQUID-BASED PAP SMEAR WITH HPV GENOTYPING REGARDLESS OF INTERPRETATION ([facility])    Unable to lose weight        Relevant Orders    CBC & Differential    Comprehensive Metabolic Panel    TSH Rfx On Abnormal To Free T4    Hemoglobin A1c    Breast discharge        Relevant Orders     Prolactin    Screening for cervical cancer        Relevant Orders    LIQUID-BASED PAP SMEAR WITH HPV GENOTYPING REGARDLESS OF INTERPRETATION ([facility])            GYN annual well woman exam.   Reviewed pap guidelines. Pap performed.   Reviewed monthly self breast exams.  Instructed to call with lumps, pain, or breast discharge.  Breast d/c potential etiologies reviewed. Prolactin ordered. Reassurance provided. Likely benign.   Reviewed diet and exercise as a preventative health measures and weight loss.  Labs ordered.   Reccommended Flu Vaccine in Fall of each year.  Anxiety and depression- Increased from 50mg to 100mg. Benefits, risks, and potential se reviewed.   Abd bloating. Advised to maintain good bowel regimen. Decrease foods that increase bloating and gas.  Offered to removed IUD if bloating and concern for inability to lose weight continues. She will try interventions and call if she desires IUD removal.   Zoloft, Galactorrhea, prev care, abd bloating and exercise for weight loss education included in patient instructions.   Return in about 1 year (around 11/11/2025) for  Annual physical or sooner if needed.    Nya Vasquez, APRN  11/11/2024

## 2024-11-12 LAB
ALBUMIN SERPL-MCNC: 4.8 G/DL (ref 3.5–5.2)
ALBUMIN/GLOB SERPL: 1.6 G/DL
ALP SERPL-CCNC: 110 U/L (ref 39–117)
ALT SERPL-CCNC: 14 U/L (ref 1–33)
AST SERPL-CCNC: 21 U/L (ref 1–32)
BASOPHILS # BLD AUTO: 0.05 10*3/MM3 (ref 0–0.2)
BASOPHILS NFR BLD AUTO: 0.7 % (ref 0–1.5)
BILIRUB SERPL-MCNC: 0.3 MG/DL (ref 0–1.2)
BUN SERPL-MCNC: 8 MG/DL (ref 6–20)
BUN/CREAT SERPL: 12.1 (ref 7–25)
CALCIUM SERPL-MCNC: 9.4 MG/DL (ref 8.6–10.5)
CHLORIDE SERPL-SCNC: 103 MMOL/L (ref 98–107)
CO2 SERPL-SCNC: 25 MMOL/L (ref 22–29)
CREAT SERPL-MCNC: 0.66 MG/DL (ref 0.57–1)
EGFRCR SERPLBLD CKD-EPI 2021: 122 ML/MIN/1.73
EOSINOPHIL # BLD AUTO: 0.17 10*3/MM3 (ref 0–0.4)
EOSINOPHIL NFR BLD AUTO: 2.5 % (ref 0.3–6.2)
ERYTHROCYTE [DISTWIDTH] IN BLOOD BY AUTOMATED COUNT: 12 % (ref 12.3–15.4)
GLOBULIN SER CALC-MCNC: 3 GM/DL
GLUCOSE SERPL-MCNC: 80 MG/DL (ref 65–99)
HBA1C MFR BLD: 5.1 % (ref 4.8–5.6)
HCT VFR BLD AUTO: 42.7 % (ref 34–46.6)
HGB BLD-MCNC: 14.3 G/DL (ref 12–15.9)
IMM GRANULOCYTES # BLD AUTO: 0.02 10*3/MM3 (ref 0–0.05)
IMM GRANULOCYTES NFR BLD AUTO: 0.3 % (ref 0–0.5)
LYMPHOCYTES # BLD AUTO: 2.11 10*3/MM3 (ref 0.7–3.1)
LYMPHOCYTES NFR BLD AUTO: 31.1 % (ref 19.6–45.3)
MCH RBC QN AUTO: 28.1 PG (ref 26.6–33)
MCHC RBC AUTO-ENTMCNC: 33.5 G/DL (ref 31.5–35.7)
MCV RBC AUTO: 83.9 FL (ref 79–97)
MONOCYTES # BLD AUTO: 0.49 10*3/MM3 (ref 0.1–0.9)
MONOCYTES NFR BLD AUTO: 7.2 % (ref 5–12)
NEUTROPHILS # BLD AUTO: 3.95 10*3/MM3 (ref 1.7–7)
NEUTROPHILS NFR BLD AUTO: 58.2 % (ref 42.7–76)
NRBC BLD AUTO-RTO: 0 /100 WBC (ref 0–0.2)
PLATELET # BLD AUTO: 406 10*3/MM3 (ref 140–450)
POTASSIUM SERPL-SCNC: 4.2 MMOL/L (ref 3.5–5.2)
PROLACTIN SERPL-MCNC: 25.5 NG/ML (ref 4.8–33.4)
PROT SERPL-MCNC: 7.8 G/DL (ref 6–8.5)
RBC # BLD AUTO: 5.09 10*6/MM3 (ref 3.77–5.28)
REF LAB TEST METHOD: NORMAL
SODIUM SERPL-SCNC: 139 MMOL/L (ref 136–145)
TSH SERPL DL<=0.005 MIU/L-ACNC: 1.43 UIU/ML (ref 0.27–4.2)
WBC # BLD AUTO: 6.79 10*3/MM3 (ref 3.4–10.8)

## (undated) DEVICE — APPL CHLORAPREP TINTED 26ML TEAL

## (undated) DEVICE — COATED VICRYL  (POLYGLACTIN 910) SUTURE, VIOLET BRAIDED, STERILE, SYNTHETIC ABSORBABLE SUTURE: Brand: COATED VICRYL

## (undated) DEVICE — SOL IRR NACL 0.9PCT BT 1000ML

## (undated) DEVICE — SOL IRR H2O BTL 1000ML STRL

## (undated) DEVICE — TRY SPINE BLCK WHITACRE 25G 3X5IN

## (undated) DEVICE — TRAP FLD MINIVAC MEGADYNE 100ML

## (undated) DEVICE — CLTH CLENS READYCLEANSE PERI CARE PK/5

## (undated) DEVICE — SUT GUT CHRM 1 CTX 36IN 905H

## (undated) DEVICE — ADHS SKIN PREMIERPRO EXOFIN TOPICAL HI/VISC .5ML

## (undated) DEVICE — PK C/SECT 10

## (undated) DEVICE — SUT PDS 1 TP1 48IN Z880G BX/12

## (undated) DEVICE — MAT PREVALON MOBL TRANSFR AIR WO/PAD 39X80IN

## (undated) DEVICE — 4-PORT MANIFOLD: Brand: NEPTUNE 2

## (undated) DEVICE — SUT GUT CHRM 2/0 CT1 27IN 811H

## (undated) DEVICE — TBG PENCL TELESCP MEGADYNE SMOKE EVAC 10FT

## (undated) DEVICE — GLV SURG BIOGEL LTX PF 6 1/2

## (undated) DEVICE — PATIENT RETURN ELECTRODE, SINGLE-USE, CONTACT QUALITY MONITORING, ADULT, WITH 9FT CORD, FOR PATIENTS WEIGING OVER 33LBS. (15KG): Brand: MEGADYNE

## (undated) DEVICE — SUT VIC 4/0 KS 27IN VCP662H